# Patient Record
Sex: MALE | Race: WHITE | NOT HISPANIC OR LATINO | Employment: OTHER | URBAN - METROPOLITAN AREA
[De-identification: names, ages, dates, MRNs, and addresses within clinical notes are randomized per-mention and may not be internally consistent; named-entity substitution may affect disease eponyms.]

---

## 2017-03-20 ENCOUNTER — ALLSCRIPTS OFFICE VISIT (OUTPATIENT)
Dept: OTHER | Facility: OTHER | Age: 37
End: 2017-03-20

## 2017-05-11 ENCOUNTER — ALLSCRIPTS OFFICE VISIT (OUTPATIENT)
Dept: OTHER | Facility: OTHER | Age: 37
End: 2017-05-11

## 2017-05-11 DIAGNOSIS — R94.6 ABNORMAL RESULTS OF THYROID FUNCTION STUDIES: ICD-10-CM

## 2017-09-05 ENCOUNTER — APPOINTMENT (OUTPATIENT)
Dept: LAB | Facility: HOSPITAL | Age: 37
End: 2017-09-05
Payer: MEDICARE

## 2017-09-05 DIAGNOSIS — R94.6 ABNORMAL RESULTS OF THYROID FUNCTION STUDIES: ICD-10-CM

## 2017-09-05 LAB
T3 SERPL-MCNC: 0.5 NG/ML (ref 0.6–1.8)
T4 FREE SERPL-MCNC: 0.92 NG/DL (ref 0.76–1.46)
TSH SERPL DL<=0.05 MIU/L-ACNC: 2.7 UIU/ML (ref 0.36–3.74)

## 2017-09-05 PROCEDURE — 84439 ASSAY OF FREE THYROXINE: CPT

## 2017-09-05 PROCEDURE — 84443 ASSAY THYROID STIM HORMONE: CPT

## 2017-09-05 PROCEDURE — 36415 COLL VENOUS BLD VENIPUNCTURE: CPT

## 2017-09-05 PROCEDURE — 84480 ASSAY TRIIODOTHYRONINE (T3): CPT

## 2017-09-14 ENCOUNTER — GENERIC CONVERSION - ENCOUNTER (OUTPATIENT)
Dept: OTHER | Facility: OTHER | Age: 37
End: 2017-09-14

## 2017-09-14 DIAGNOSIS — R94.6 ABNORMAL RESULTS OF THYROID FUNCTION STUDIES: ICD-10-CM

## 2017-10-31 ENCOUNTER — APPOINTMENT (OUTPATIENT)
Dept: LAB | Facility: HOSPITAL | Age: 37
End: 2017-10-31
Payer: MEDICARE

## 2017-10-31 DIAGNOSIS — R94.6 ABNORMAL RESULTS OF THYROID FUNCTION STUDIES: ICD-10-CM

## 2017-10-31 LAB
T3FREE SERPL-MCNC: 3.33 PG/ML (ref 2.3–4.2)
TSH SERPL DL<=0.05 MIU/L-ACNC: 3.18 UIU/ML (ref 0.36–3.74)

## 2017-10-31 PROCEDURE — 84481 FREE ASSAY (FT-3): CPT

## 2017-10-31 PROCEDURE — 84443 ASSAY THYROID STIM HORMONE: CPT

## 2017-10-31 PROCEDURE — 36415 COLL VENOUS BLD VENIPUNCTURE: CPT

## 2017-11-03 ENCOUNTER — ALLSCRIPTS OFFICE VISIT (OUTPATIENT)
Dept: OTHER | Facility: OTHER | Age: 37
End: 2017-11-03

## 2017-11-03 DIAGNOSIS — J30.2 OTHER SEASONAL ALLERGIC RHINITIS: ICD-10-CM

## 2017-11-04 NOTE — PROGRESS NOTES
Assessment  1  Anxiety (300 00) (F41 9)   2  Depression (311) (F32 9)   3  Thyroid function test abnormal (794 5) (R94 6)   4  Chronic sinusitis (473 9) (J32 9)   5  Seasonal allergies (477 9) (J30 2)    Plan  Chronic sinusitis    · 1 - Hao Taveras MD, Kimi Cardoza  (Otolaryngology) Co-Management  *  Status: Hold For - Scheduling  Requested  for: 23BHI0856  Care Summary provided  : Yes  Cigarette nicotine dependence in remission    · BuPROPion HCl ER (SR) 150 MG Oral Tablet Extended Release 12 Hour (Wellbutrin SR); Take  1 tablet twice daily  Screening for depression    · *VB-Depression Screening; Status:Complete;   Done: 08ICN2000 12:48PM   · *VB-Depression Screening ; every 1 year; Last 17IQD0794; Next 85XXF0981; Status:Active  Seasonal allergies    · (1) ALLERGY, [facility] PANEL ADULT; Status:Active; Requested W:83VQN9766;     Discussion/Summary  Discussion Summary:   Allergies - Will check [place] allergy panel  sinusitis x 1 year - D/w pt ENT vs sinus CT  He would like to start with ENT and have a second opinion on if the CT of sinuses is necessary  Referral given to Dr Hao Taveras    - Stable on medications and doing well  Will see psychiatrist 12/29, aware he can make appointment here if any symptoms worsen sooner  loss - DIscussed increasing protein and condensing calories by eating higher calorie good foods if he does not like large portions  to follow up in 4 months  Counseling Documentation With Imm: The patient was counseled regarding diagnostic results,-- instructions for management,-- risk factor reductions,-- prognosis,-- patient and family education,-- impressions,-- risks and benefits of treatment options,-- importance of compliance with treatment  Medication SE Review and Pt Understands Tx: Possible side effects of new medications were reviewed with the patient/guardian today  The treatment plan was reviewed with the patient/guardian   The patient/guardian understands and agrees with the treatment plan      Chief Complaint  Chief Complaint Free Text Note Form: Pt is here for a f/u appt  BW       History of Present Illness  HPI: Patient is here for follow up of sinusitis and lab work  Patient has history of abnormal Free T3, new labs from 10/31 reviewed, normal T3 and TSH    - Patient has been having ongoing issue with his sinuses  He was seen 1 year ago and reports that he was given an antibiotic which he states did not work and was later given a steroid  He then saw the [facility] for a sinus infection in May and was told it was allergies  He was given flonase and zyrtec  He has continued to take these medications daily  He was then seen in September for sinusitis and started on Augmentin  He felt better while taking the antibiotic, after stopping his symptoms returned almost immediately  He complains today of irritation and feeling like something is stuck in the left nostril  He reports frequent sinus headaches on the left side  No nasal discharge on the left side  He denies fever, chills, runny nose, sore throat or ear pain or pressure  note he was using an air condition for 4 months that he later came to find had a lot of mold in it  - Patient has a new psychiatrist in [facility]  He reports that in [place] the turnover of psychiatrists was very quick and he had a hard time getting appointments and building a relationship  He has an appointment on 12/29 to follow up in [place]  He feels that he is doing well  He has been on disability but 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 and with his [employer]  He reports that 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  He feels being connected with the community has helped him a lot  He jokes, I donât even have time to be anxious anymore He has been taking his medications as prescribed and has no concerns or issues with them   In response to his 10lb weight loss over the summer, he reports this is normal for him because he does not have a car and walks everywhere and is very active  He states I do not eat for pleasure, I eat for survival, the [employer] did that to me      Review of Systems  PHQ-9 Depression Scale: Over the past 2 weeks, how often have you been bothered by the following problems? 1 ) Little interest or pleasure in doing things? Not at all    2 ) Feeling down, depressed or hopeless? Not at all    3 ) Trouble falling asleep or sleeping too much? Several days  4 ) Feeling tired or having little energy? Not at all    5 ) Poor appetite or overeating? Nearly every day  6 ) Feeling bad about yourself, or that you are a failure, or have let yourself or your family down? Not at all    7 ) Trouble concentrating on things, such as reading a newspaper or watching television? Not at all    8 ) Moving or speaking so slowly that other people could have noticed, or the opposite, moving or speaking faster than usual? Not at all  How difficult have these problems made it for you to do your work, take care of things at home, or get along with people? Not at all  Score 4      Active Problems  1  Anxiety (300 00) (F41 9)   2  Arthralgia of left knee (719 46) (M25 562)   3  Cigarette nicotine dependence in remission (V15 82) (F17 211)   4  Depression (311) (F32 9)   5  Fatigue (780 79) (R53 83)   6  Lung nodule (793 11) (R91 1)   7  Sinusitis (473 9) (J32 9)   8  Testicular discomfort (608 9) (N50 819)   9  Thyroid function test abnormal (794 5) (R94 6)    Past Medical History  1  History of Abdominal discomfort (789 00) (R10 9)   2  History of Acute maxillary sinusitis (461 0) (J01 00)   3  History of Cellulitis of back (682 2) (L03 312)   4  History of Compound nevus of back (216 5) (D23 5)   5  History of Corneal irritation of left eye (371 89) (Y98 301)   6  History of Dacrocystitis, bilateral (375 30) (H04 303)   7  History of Dry eye (375 15) (H04 129)   8   History of Foreign body in eye, left, initial encounter (74 310 744) (T15 92XA)   9  History of Foreign body in eye, right, initial encounter (10-32549174) (T15 91XA)   10  History of acute sinusitis (V12 69) (Z87 09)   11  History of common cold (V12 09) (Z86 19)   12  History of constipation (V12 79) (Z87 19)   13  History of epididymitis (V13 89) (Z87 438)   14  History of orchitis (V13 89) (Z87 438)   15  History of Hydrocele, acquired (603 9) (N43 3)   16  History of Infected cyst of skin (706 2) (L72 9,L08 9)   17  History of Infected sebaceous cyst of skin (706 2) (L72 3,L08 9)   18  History of Joint pain in fingers of right hand (719 44) (M25 541)   19  History of Lipid screening (V77 91) (Z13 220)   20  History of Mucositis oral (528 00) (K12 30)   21  History of Need for Tdap vaccination (V06 1) (Z23)   22  History of Needs flu shot (V04 81) (Z23)   23  History of Orchalgia (608 9) (N50 819)   24  History of TSH elevation (794 5) (R94 6)   25  History of Weight loss (783 21) (R63 4)  Active Problems And Past Medical History Reviewed: The active problems and past medical history were reviewed and updated today  Surgical History  1  Denied: History Of Prior Surgery  Surgical History Reviewed: The surgical history was reviewed and updated today  Family History  Mother    1  Family history of Cancer   2  Family history of glioblastoma (V16 8) (Z80 8)  Sister    3  Family history of Type 1 diabetes mellitus with complications  Family History Reviewed: The family history was reviewed and updated today  Social History   · Former smoker (I14 72) (N82 080)   · Denied: History of IV drugs   · No alcohol use   · No drug use   · Recently stopped smoking   · Denied: History of Uses marijuana  Social History Reviewed: The social history was reviewed and updated today  Current Meds   1  BuPROPion HCl ER (SR) 150 MG Oral Tablet Extended Release 12 Hour; Take 1 tablet twice daily;    Therapy: 42WKU9765 to (Eladia Hernandez)  Requested for: 45UAI7678; Last Rx:11May2017   Ordered   2  Divalproex Sodium 250 MG Oral Tablet Delayed Release; TAKE 3 TABLET Bedtime; Therapy: 59DBM7272 to Recorded   3  Fluticasone Propionate 50 MCG/ACT Nasal Suspension; USE 1 SPRAY IN EACH NOSTRIL TWICE   DAILY; Therapy: 14Sep2017 to Recorded   4  Restasis 0 05 % Ophthalmic Emulsion; INSTILL 1 DROP IN [facility] EYE TWICE DAILY; Therapy: (Recorded:11May2017) to Recorded   5  RisperiDONE 4 MG Oral Tablet; TAKE 1 TABLET DAILY; Therapy: (Recorded:17Cid9094) to Recorded   6  ZyrTEC Allergy 10 MG Oral Tablet; TAKE 1 TABLET DAILY AS DIRECTED; Therapy: 14Sep2017 to Recorded  Medication List Reviewed: The medication list was reviewed and updated today  Allergies  1  Lithium Carbonate TABS  2  No Known Environmental Allergies   3  No Known Food Allergies    Vitals  Vital Signs    Recorded: 56RMK0015 12:38PM   Temperature 97 9 F, Oral   Heart Rate 84   Systolic 027, LUE, Sitting   Diastolic 78, LUE, Sitting   Height 6 ft 0 75 in   Weight 149 lb 4 oz   BMI Calculated 19 83   BSA Calculated 1 9   O2 Saturation 98, RA     Physical Exam    Constitutional   General appearance: No acute distress, well appearing and well nourished  underweight  Eyes   Conjunctiva and lids: No swelling, erythema, or discharge  Pupils and irises: Equal, round and reactive to light  Ears, Nose, Mouth, and Throat   External inspection of ears and nose: Normal     Otoscopic examination: Tympanic membrance translucent with normal light reflex  Canals patent without erythema  Nasal mucosa, septum, and turbinates: Normal without edema or erythema  Oropharynx: Normal with no erythema, edema, exudate or lesions  Pulmonary   Respiratory effort: No increased work of breathing or signs of respiratory distress  Auscultation of lungs: Clear to auscultation, equal breath sounds bilaterally, no wheezes, no rales, no rhonci      Cardiovascular   Auscultation of heart: Normal rate and rhythm, normal S1 and S2, without murmurs  Examination of extremities for edema and/or varicosities: Normal     Lymphatic   Palpation of lymph nodes in neck: No lymphadenopathy  Musculoskeletal   Gait and station: Normal     Skin   Skin and subcutaneous tissue: Normal without rashes or lesions  Psychiatric   Orientation to person, place and time: Normal     Mood and affect: Normal  -- Very pleasant and cooperative  Head and Face: Maxillary sinuses: non-tender on the right-- and-- non-tender on the left  Frontal sinuses: non-tender on the right-- and-- non-tender on the left  Results/Data  *VB-Depression Screening 63TUR0301 12:48PM Yi Jaquez     Test Name Result Flag Reference   Depression Scale Result      Depression Screen - Negative For Symptoms     (1) FREE T3 78JVJ7760 12:58PM Hansjunie Mackenzie Order Number: [id number]     Test Name Result Flag Reference   FREE T3 3 33 pg/mL  2 30-4 20     (1) TSH 49Czc9267 12:58PM Soledad Ramirez    Order Number: [id number]     Test Name Result Flag Reference   TSH 3 180 uIU/mL  0 358-3 740   Patients undergoing fluorescein dye angiography may retain small amounts of fluorescein in the body for 48-72 hours post procedure  Samples containing fluorescein can produce falsely depressed TSH values  If the patient had this procedure,a specimen should be resubmitted post fluorescein clearance  Future Appointments    Date/Time Provider Specialty Site   11/10/2017 11:15 AM Katie Yuen DO Internal Medicine [facility]     Signatures   Electronically signed by : Parul Hernandez; Nov  3 2017  1:29PM EST                       (Author)    Electronically signed by :  Tez Cho MD; Nov  3 2017  4:18PM EST                       (Author)

## 2018-01-12 VITALS
HEIGHT: 74 IN | DIASTOLIC BLOOD PRESSURE: 70 MMHG | WEIGHT: 159 LBS | SYSTOLIC BLOOD PRESSURE: 110 MMHG | BODY MASS INDEX: 20.41 KG/M2 | HEART RATE: 80 BPM

## 2018-01-12 NOTE — RESULT NOTES
Verified Results  (1) TSH 31Oct2017 12:58PM Ashli Raheel AMADO Order Number: [id number]     Test Name Result Flag Reference   TSH 3 180 uIU/mL  0 358-3 740   Patients undergoing fluorescein dye angiography may retain small amounts of fluorescein in the body for 48-72 hours post procedure  Samples containing fluorescein can produce falsely depressed TSH values  If the patient had this procedure,a specimen should be resubmitted post fluorescein clearance       (1) FREE T3 31Oct2017 12:58PM Dannie Darling Order Number: [id number]     Test Name Result Flag Reference   FREE T3 3 33 pg/mL  2 30-4 20

## 2018-01-12 NOTE — PROGRESS NOTES
Assessment    1  Foreign body in eye, left, initial encounter (76 310 744) (T15 92XA)   2  Corneal irritation of left eye (371 89) (I00 046)    Discussion/Summary    Patient was instructed to continue with Systane eyedrops alone or together with use of normal saline eyedrops  He is instructed to call if he should experience any additional difficulty  Chief Complaint    1  Eye Pain  Pt feels like he has something in his eye since Friday  He started using Refresh eyedrops that caused him pain, and switched to Systane Ultra and it doesn't cause him pain  History of Present Illness  HPI: Patient seen with complaints of pain from his left eye  He had some irritation from a foreign and had gone to an eye doctor at the [place] where he was found to have an eyelash in his eye which was removed with a Q-tip and irrigation  Subsequently he had a similar sensation in his eyes so he attempted to remove it himself with use of a Q-tip  This that time he has had some irritation in his left eye but unable to notice any foreign body  He was told to get some Refresh Tears but this resulted in some increased discomfort and he didn't like the greasy feeling, so he switched to Systane eyedrops which did not cause any pain or discomfort  He presented here to evaluate his if for any additional problem      Active Problems    1  Anxiety (300 00) (F41 9)   2  Arthralgia of left knee (719 46) (M25 562)   3  Depression (311) (F32 9)   4  Epididymitis (604 90) (N45 1)   5  Fatigue (780 79) (R53 83)   6  Lung nodule (793 11) (R91 1)   7  Orchalgia (608 9) (N50 9)   8  Tobacco abuse (305 1) (Z72 0)   9  Weight loss (783 21) (R63 4)    Social History    · Former smoker (V15 82) (L98 424)   · started patch on 8/3/15   · Denied: History of IV drugs   · No alcohol use   · No drug use   · Denied: History of Uses marijuana    Current Meds   1  Daily Multivitamin TABS; TAKE 1 TABLET DAILY; Therapy: (Recorded:61Yac1050) to Recorded   2  Divalproex Sodium 250 MG Oral Tablet Delayed Release; TAKE 1 TABLET 3 TIMES   DAILY; Therapy: 35LEA0005 to Recorded   3  RisperiDONE 4 MG Oral Tablet; TAKE 1 TABLET DAILY; Therapy: (Recorded:74Hny0682) to Recorded   4  Systane Balance 0 6 % Ophthalmic Solution; INSTILL 2 DROP 3 times daily; Therapy: 79BKM7724 to Recorded    Allergies    1  Lithium Carbonate TABS    2  No Known Environmental Allergies   3  No Known Food Allergies    Vitals   Recorded: 30SBY3284 10:35AM   Temperature 97 9 F, Oral   Heart Rate 093   Systolic 519, LUE, Sitting   Diastolic 70, LUE, Sitting   Height 6 ft 1 in   Weight 155 lb 8 oz   BMI Calculated 20 52   BSA Calculated 1 93   O2 Saturation 99, RA     Physical Exam    Eyes   Conjunctiva and lids: No swelling, erythema, or discharge  With use of flourescein strip the left eye was examined and there was no evidence of corneal ulcer  Pupils and irises: Equal, round and reactive to light           Future Appointments    Date/Time Provider Specialty Site   05/04/2016 01:30 PM Harish Garcia, [address] [facility] Internal Medicine [facility] PRIMARY CARE     Signatures   Electronically signed by : Carlos Lundberg MD; Jan 18 2016 11:19AM EST                       (Author)

## 2018-01-13 VITALS
DIASTOLIC BLOOD PRESSURE: 70 MMHG | BODY MASS INDEX: 20.47 KG/M2 | SYSTOLIC BLOOD PRESSURE: 110 MMHG | OXYGEN SATURATION: 98 % | HEIGHT: 74 IN | HEART RATE: 90 BPM | WEIGHT: 159.5 LBS | TEMPERATURE: 98.5 F

## 2018-01-14 VITALS
DIASTOLIC BLOOD PRESSURE: 78 MMHG | TEMPERATURE: 97.9 F | OXYGEN SATURATION: 98 % | SYSTOLIC BLOOD PRESSURE: 114 MMHG | BODY MASS INDEX: 19.78 KG/M2 | WEIGHT: 149.25 LBS | HEART RATE: 84 BPM | HEIGHT: 73 IN

## 2018-01-17 ENCOUNTER — TRANSCRIBE ORDERS (OUTPATIENT)
Dept: ADMINISTRATIVE | Facility: HOSPITAL | Age: 38
End: 2018-01-17

## 2018-01-17 DIAGNOSIS — J32.9 CHRONIC SINUSITIS, UNSPECIFIED LOCATION: Primary | ICD-10-CM

## 2018-01-22 VITALS
DIASTOLIC BLOOD PRESSURE: 60 MMHG | HEART RATE: 106 BPM | TEMPERATURE: 97.5 F | HEIGHT: 73 IN | SYSTOLIC BLOOD PRESSURE: 118 MMHG | WEIGHT: 151.38 LBS | BODY MASS INDEX: 20.06 KG/M2 | OXYGEN SATURATION: 99 %

## 2018-01-26 ENCOUNTER — HOSPITAL ENCOUNTER (OUTPATIENT)
Dept: CT IMAGING | Facility: HOSPITAL | Age: 38
Discharge: HOME | End: 2018-01-26
Attending: OTOLARYNGOLOGY
Payer: MEDICARE

## 2018-01-26 DIAGNOSIS — J32.9 CHRONIC SINUSITIS, UNSPECIFIED LOCATION: ICD-10-CM

## 2018-01-26 PROCEDURE — 70486 CT MAXILLOFACIAL W/O DYE: CPT

## 2018-04-27 ENCOUNTER — OFFICE VISIT (OUTPATIENT)
Dept: INTERNAL MEDICINE CLINIC | Facility: CLINIC | Age: 38
End: 2018-04-27
Payer: MEDICARE

## 2018-04-27 VITALS
HEIGHT: 75 IN | SYSTOLIC BLOOD PRESSURE: 110 MMHG | WEIGHT: 147.4 LBS | HEART RATE: 109 BPM | OXYGEN SATURATION: 98 % | BODY MASS INDEX: 18.33 KG/M2 | DIASTOLIC BLOOD PRESSURE: 64 MMHG | TEMPERATURE: 98.7 F

## 2018-04-27 DIAGNOSIS — J06.9 UPPER RESPIRATORY TRACT INFECTION, UNSPECIFIED TYPE: Primary | ICD-10-CM

## 2018-04-27 PROBLEM — J30.2 SEASONAL ALLERGIES: Status: ACTIVE | Noted: 2017-11-03

## 2018-04-27 PROBLEM — J32.9 CHRONIC SINUSITIS: Status: ACTIVE | Noted: 2017-11-03

## 2018-04-27 PROBLEM — R94.6 THYROID FUNCTION TEST ABNORMAL: Status: ACTIVE | Noted: 2017-09-14

## 2018-04-27 PROCEDURE — 99213 OFFICE O/P EST LOW 20 MIN: CPT | Performed by: NURSE PRACTITIONER

## 2018-04-27 RX ORDER — CYCLOSPORINE 0.5 MG/ML
1 EMULSION OPHTHALMIC 2 TIMES DAILY
COMMUNITY

## 2018-04-27 RX ORDER — BUPROPION HYDROCHLORIDE 150 MG/1
1 TABLET, EXTENDED RELEASE ORAL 2 TIMES DAILY
COMMUNITY
Start: 2016-05-16 | End: 2018-05-21 | Stop reason: SDUPTHER

## 2018-04-27 RX ORDER — AMOXICILLIN AND CLAVULANATE POTASSIUM 875; 125 MG/1; MG/1
1 TABLET, FILM COATED ORAL EVERY 12 HOURS SCHEDULED
Qty: 14 TABLET | Refills: 0 | Status: SHIPPED | OUTPATIENT
Start: 2018-04-27 | End: 2018-05-04

## 2018-04-27 NOTE — PATIENT INSTRUCTIONS
I do not feel your upper respiratory symptoms are due to bacterial infection - most likely viral infection  Continue allergy medication on a daily basis  Continue using nasal sprays - Ayr and ocean  Increase fluid intake  May use OTC Mucinex (or generic)- plain Mucinex - not Mucinex DM  A prescription for Augmentin was sent to your pharmacy should your symptoms worsen over the weekend or you develop fever, increased chills, productive cough (yellow/greenish sputum)  Call office if your symptoms worsen or do not improve  Upper Respiratory Infection   WHAT YOU NEED TO KNOW:   An upper respiratory infection is also called the common cold  It is an infection that can affect your nose, throat, ears, and sinuses  For healthy people, the common cold is usually not serious and does not need special treatment  Cold symptoms are usually worst for the first 3 to 5 days  Most people get better in 7 to 14 days  You may continue to cough for 2 to 3 weeks  Colds are caused by viruses and do not get better with antibiotics  DISCHARGE INSTRUCTIONS:   Return to the emergency department if:   · You have chest pain or trouble breathing  Contact your healthcare provider if:   · You have a fever over 102ºF (39°C)  · Your sore throat gets worse or you see white or yellow spots in your throat  · Your symptoms get worse after 3 to 5 days or your cold is not better in 14 days  · You have a rash anywhere on your skin  · You have large, tender lumps in your neck  · You have thick, green or yellow drainage from your nose  · You cough up thick yellow, green, or bloody mucus  · You have vomiting for more than 24 hours and cannot keep fluids down  · You have a bad earache  · You have questions or concerns about your condition or care  Medicines: You may need any of the following:  · Decongestants  help reduce nasal congestion and help you breathe more easily   If you take decongestant pills, they may make you feel restless or cause problems with your sleep  Do not use decongestant sprays for more than a few days  · Cough suppressants  help reduce coughing  Ask your healthcare provider which type of cough medicine is best for you  · NSAIDs , such as ibuprofen, help decrease swelling, pain, and fever  NSAIDs can cause stomach bleeding or kidney problems in certain people  If you take blood thinner medicine, always ask your healthcare provider if NSAIDs are safe for you  Always read the medicine label and follow directions  · Acetaminophen  decreases pain and fever  It is available without a doctor's order  Ask how much to take and how often to take it  Follow directions  Read the labels of all other medicines you are using to see if they also contain acetaminophen, or ask your doctor or pharmacist  Acetaminophen can cause liver damage if not taken correctly  Do not use more than 4 grams (4,000 milligrams) total of acetaminophen in one day  · Take your medicine as directed  Contact your healthcare provider if you think your medicine is not helping or if you have side effects  Tell him or her if you are allergic to any medicine  Keep a list of the medicines, vitamins, and herbs you take  Include the amounts, and when and why you take them  Bring the list or the pill bottles to follow-up visits  Carry your medicine list with you in case of an emergency  Follow up with your healthcare provider as directed:  Write down your questions so you remember to ask them during your visits  Self-care:   · Rest as much as possible  Slowly start to do more each day  · Drink more liquids as directed  Liquids will help thin and loosen mucus so you can cough it up  Liquids will also help prevent dehydration  Liquids that help prevent dehydration include water, fruit juice, and broth  Do not drink liquids that contain caffeine  Caffeine can increase your risk for dehydration   Ask your healthcare provider how much liquid to drink each day  · Soothe a sore throat  Gargle with warm salt water  This helps your sore throat feel better  Make salt water by dissolving ¼ teaspoon salt in 1 cup warm water  You may also suck on hard candy or throat lozenges  You may use a sore throat spray  · Use a humidifier or vaporizer  Use a cool mist humidifier or a vaporizer to increase air moisture in your home  This may make it easier for you to breathe and help decrease your cough  · Use saline nasal drops as directed  These help relieve congestion  · Apply petroleum-based jelly around the outside of your nostrils  This can decrease irritation from blowing your nose  · Do not smoke  Nicotine and other chemicals in cigarettes and cigars can make your symptoms worse  They can also cause infections such as bronchitis or pneumonia  Ask your healthcare provider for information if you currently smoke and need help to quit  E-cigarettes or smokeless tobacco still contain nicotine  Talk to your healthcare provider before you use these products  Prevent spreading your cold to others:   · Try to stay away from other people during the first 2 to 3 days of your cold when it is more easily spread  · Do not share food or drinks  · Do not share hand towels with household members  · Wash your hands often, especially after you blow your nose  Turn away from other people and cover your mouth and nose with a tissue when you sneeze or cough  © 2017 [employer] Information is for End User's use only and may not be sold, redistributed or otherwise used for commercial purposes  All illustrations and images included in CareNotes® are the copyrighted property of A D A M , Inc  or Kaiden Padilla  The above information is an  only  It is not intended as medical advice for individual conditions or treatments   Talk to your doctor, nurse or pharmacist before following any medical regimen to see if it is safe and effective for you

## 2018-04-27 NOTE — PROGRESS NOTES
Assessment/Plan:     Diagnoses and all orders for this visit:    Upper respiratory tract infection, unspecified type  No overt signs or symptoms of bacterial infection  Most likely viral in origin  I did sent Rx for Augmentin and instructed patient to  antibiotics if his symptoms worsen over the weekend - develops fever, increased chills, increased sore throat or productive cough (yellow/green sputum)  Instructed patient to take allergy medication on a daily basis  Continue nasal sprays  May take OTC Mucinex (told patient not to get DM due to elevated heart rate)  Instructed to increase fluids  Call office should symptoms worsen or not improve  Written information regarding URI given to patient for his perusal     -     amoxicillin-clavulanate (AUGMENTIN) 875-125 mg per tablet; Take 1 tablet by mouth every 12 (twelve) hours for 7 days    Other orders  -     buPROPion (WELLBUTRIN SR) 150 mg 12 hr tablet; Take 1 tablet by mouth 2 (two) times a day  -     Cetirizine HCl (ZYRTEC ALLERGY) 10 MG CAPS; Take 1 tablet by mouth daily  -     cycloSPORINE (RESTASIS) 0 05 % ophthalmic emulsion; Apply 1 drop to eye 2 (two) times a day      Subjective:      Patient ID: Vinayak Rogers II is a 41 y o  male  Patient is sen for a same day visit due to c/o sore throat since Wednesday  He states that on Wednesday morning he woke up with sore throat  He continues to have a sore throat but now has  Developed dry cough, "achy", PND and runny nose  Denies headache, ear aches, fever, abdominal pain, nausea, diarreha  He does have chills  Denies sick contacts  He has tried allergy medication (claritin yesterday)  Patient does have a history of "chronic sinusitis" and has seen [facility] for evaluation and is due to see them in follow up next Friday  He states his Flonase was d/c due to nose bleeds and he was instructed to use [place] and [place] nasal sprays    His CT scan of the sinuses showed "Well pneumatized paranasal sinuses "  Patient also follows regularly with the [facility] and see psychiatrist in [place] for his schizo-effective disorder  Sore Throat  Patient complains of sore throat  Associated symptoms include chills, nasal blockage, post nasal drip and sore throat  Onset of symptoms was 2 days ago, and have been unchanged since that time  He is drinking moderate amounts of fluids  He has not had recent close exposure to someone with proven streptococcal pharyngitis  The following portions of the patient's history were reviewed and updated as appropriate: allergies, current medications, past family history, past medical history, past social history, past surgical history and problem list     Review of Systems   Constitutional: Positive for chills  Negative for fatigue and fever  HENT: Positive for postnasal drip, sneezing and sore throat  Negative for ear discharge, ear pain, sinus pain and sinus pressure  Eyes: Negative  Respiratory: Positive for cough  Negative for chest tightness and shortness of breath  Cardiovascular: Negative for chest pain, palpitations and leg swelling  Gastrointestinal: Negative  Genitourinary: Negative  Skin: Negative  Neurological: Negative for dizziness, light-headedness and headaches  Hematological: Negative for adenopathy  Does not bruise/bleed easily  Objective:    /64 (BP Location: Left arm, Patient Position: Sitting, Cuff Size: Adult)   Pulse (!) 109   Temp 98 7 °F (37 1 °C) (Oral)   Ht 6' 2 5" (1 892 m)   Wt 66 9 kg (147 lb 6 4 oz)   SpO2 98%   BMI 18 67 kg/m²      Physical Exam   Constitutional: He is oriented to person, place, and time  He appears well-developed and well-nourished  HENT:   Head: Normocephalic and atraumatic  Right Ear: External ear normal    Left Ear: External ear normal    Nose: Nose normal    Mouth/Throat: No oropharyngeal exudate     Bilateral ears unremarkable with good cone of light seen     Throat is slightly erythematous but no exudate  Eyes: Conjunctivae and EOM are normal  Pupils are equal, round, and reactive to light  Neck: Normal range of motion  Neck supple  No thyromegaly present  Cardiovascular: Normal rate, regular rhythm and normal heart sounds  Pulmonary/Chest: Effort normal and breath sounds normal  He has no wheezes  Abdominal: Soft  Bowel sounds are normal    Musculoskeletal: Normal range of motion  He exhibits no edema or tenderness  Lymphadenopathy:     He has no cervical adenopathy  Neurological: He is alert and oriented to person, place, and time  Skin: Skin is warm and dry  No rash noted  No erythema  No pallor  Psychiatric: He has a normal mood and affect   His behavior is normal  Judgment and thought content normal

## 2018-05-08 RX ORDER — BUPROPION HYDROCHLORIDE 150 MG/1
TABLET, EXTENDED RELEASE ORAL
Qty: 180 TABLET | Refills: 1 | OUTPATIENT
Start: 2018-05-08

## 2018-05-21 ENCOUNTER — OFFICE VISIT (OUTPATIENT)
Dept: INTERNAL MEDICINE CLINIC | Facility: CLINIC | Age: 38
End: 2018-05-21
Payer: MEDICARE

## 2018-05-21 VITALS
HEIGHT: 74 IN | SYSTOLIC BLOOD PRESSURE: 138 MMHG | HEART RATE: 86 BPM | DIASTOLIC BLOOD PRESSURE: 82 MMHG | RESPIRATION RATE: 16 BRPM | BODY MASS INDEX: 19.07 KG/M2 | OXYGEN SATURATION: 99 % | TEMPERATURE: 99.1 F | WEIGHT: 148.6 LBS

## 2018-05-21 DIAGNOSIS — F41.9 ANXIETY: ICD-10-CM

## 2018-05-21 DIAGNOSIS — R94.6 THYROID FUNCTION TEST ABNORMAL: ICD-10-CM

## 2018-05-21 DIAGNOSIS — K52.9 GASTROENTERITIS: ICD-10-CM

## 2018-05-21 DIAGNOSIS — Z13.220 SCREENING, LIPID: ICD-10-CM

## 2018-05-21 DIAGNOSIS — J30.2 SEASONAL ALLERGIC RHINITIS, UNSPECIFIED TRIGGER: ICD-10-CM

## 2018-05-21 DIAGNOSIS — L72.9 SKIN CYST: ICD-10-CM

## 2018-05-21 DIAGNOSIS — Z87.891 DISCONTINUED SMOKING: Primary | ICD-10-CM

## 2018-05-21 DIAGNOSIS — Z13.1 ENCOUNTER FOR SCREENING EXAMINATION FOR IMPAIRED GLUCOSE REGULATION AND DIABETES MELLITUS: ICD-10-CM

## 2018-05-21 PROCEDURE — 99214 OFFICE O/P EST MOD 30 MIN: CPT | Performed by: PHYSICIAN ASSISTANT

## 2018-05-21 RX ORDER — BUPROPION HYDROCHLORIDE 150 MG/1
150 TABLET, EXTENDED RELEASE ORAL 2 TIMES DAILY
Qty: 180 TABLET | Refills: 3 | Status: SHIPPED | OUTPATIENT
Start: 2018-05-21 | End: 2018-08-24 | Stop reason: SDUPTHER

## 2018-05-21 RX ORDER — LORATADINE 10 MG/1
10 TABLET ORAL DAILY
COMMUNITY
End: 2018-05-21 | Stop reason: ALTCHOICE

## 2018-05-21 NOTE — ASSESSMENT & PLAN NOTE
Symptoms are likely viral in origin and self limiting  Do not use antidiarrheals  Maintain adequate rest   Reviewed importance of adequate fluid intake (small frequent sips) to prevent dehydration  Encouraged use of electrolyte containing sports drinks (Gatorade or Powerade) mixed with water and take small frequent sips throughout the day  Early refeeding can reduce illness duration and improve outcomes  Encourage small bites, bland diet  Avoid fatty foods, greasy foods, and dairy which all may worsen symptoms  Discussed hand hygiene to prevent spread  Minimize close contact with other individuals while symptomatic  Gradual advancement of diet from bland foods to regular diet  Call the office if symptoms worsen or do not improve  Discussed red flag and ER precautions which need immediate eval and treat  May add OTC fiber supplement for regularity going forward  discussed patient's weight history as well as oral intake  Patient currently notes he eats 1-2 meals a day and is very active walking throughout the day  Due to his height and weight discussed his caloric intake need  Patient notes he has sufficient amounts of food and has in the past utilize soup kitchen and food pantry is  Discussed community  Resources including local [employer] which patient notes he has contact information for and will get in contact with  Discussed importance of oral intake    Patient verbalized understanding and will report back to our office is having any difficulty

## 2018-05-21 NOTE — PROGRESS NOTES
[facility]  Standard Office Visit  Patient ID: Julio Khan III    : 1980  Age/Gender: 41 y o  male     DATE: 2018      Assessment/Plan:    Gastroenteritis  Symptoms are likely viral in origin and self limiting  Do not use antidiarrheals  Maintain adequate rest   Reviewed importance of adequate fluid intake (small frequent sips) to prevent dehydration  Encouraged use of electrolyte containing sports drinks (Gatorade or Powerade) mixed with water and take small frequent sips throughout the day  Early refeeding can reduce illness duration and improve outcomes  Encourage small bites, bland diet  Avoid fatty foods, greasy foods, and dairy which all may worsen symptoms  Discussed hand hygiene to prevent spread  Minimize close contact with other individuals while symptomatic  Gradual advancement of diet from bland foods to regular diet  Call the office if symptoms worsen or do not improve  Discussed red flag and ER precautions which need immediate eval and treat  May add OTC fiber supplement for regularity going forward  discussed patient's weight history as well as oral intake  Patient currently notes he eats 1-2 meals a day and is very active walking throughout the day  Due to his height and weight discussed his caloric intake need  Patient notes he has sufficient amounts of food and has in the past utilize soup kitchen and food pantry is  Discussed community  Resources including local food bank which patient notes he has contact information for and will get in contact with  Discussed importance of oral intake  Patient verbalized understanding and will report back to our office is having any difficulty    Anxiety  Well controlled at this time on current medications  Follows with therapist and psychiatrist through [facility]   Currently taking Wellbutrin along with his other psychiatric medications    Symptoms are controlled will continue Wellbutrin  Discussed with time as he is no longer smoking may consider transitioning off of this medication however at this time is feeling well on his current medications  No further dose adjustments at this time  Continue same meds  Keep our office informed of any change in treatment plan per Psychiatry  Thyroid function test abnormal    History of abnormal TSH  Improved on repeat level  Will recheck TSH    Seasonal allergies   Patient recently seen by ENT and had a CT done of his sinuses  Adjustments were made to his antihistamine and nasal spray  He will call back to the office with the names of these medications  Skin cyst   Patient with skin cyst left of midline in area of T4 on his back  Notes he was to previously get this removed prior however symptoms improved and was not causing him pain so he elected to not proceed  Still has some induration and is bothersome at time  No redness or signs of infection  Will have him come to the office with Dr Wolm Riedel  For eval for removal       Diagnoses and all orders for this visit:    Discontinued smoking  -     buPROPion (WELLBUTRIN SR) 150 mg 12 hr tablet; Take 1 tablet (150 mg total) by mouth 2 (two) times a day    Gastroenteritis    Encounter for screening examination for impaired glucose regulation and diabetes mellitus  -     Comprehensive metabolic panel; Future    Anxiety  -     Comprehensive metabolic panel; Future  -     CBC and differential; Future  -     TSH, 3rd generation; Future    Screening, lipid  -     Comprehensive metabolic panel; Future  -     CBC and differential; Future  -     Lipid Panel with Direct LDL reflex; Future    Seasonal allergic rhinitis, unspecified trigger    Thyroid function test abnormal    Skin cyst    Other orders  -     Discontinue: loratadine (CLARITIN) 10 mg tablet;  Take 10 mg by mouth daily          Subjective:   Chief Complaint   Patient presents with    Follow-up     Pt is here for check up  Mayer Goldberg is a 41 y o  male who presents to the office on 5/21/2018 for     Follow up  Notes he wants to know how he can work to gain weight  Does eat 2 meals per day usually a breakfast of cereal or eggs  In afternoon eats peanut butter and jelly sandwiches  Notes that dinner does have chicken or dinner  Notes he had mole removed off his back about 1 year ago, wants to get it checked to see if it came back  Does have access to food  Notes he budgets a certain amount per month for food but at times  Notes he did participate with food bank when he was first in the area after leaving the homeless shelter  Notes things with therapist are going ok  He is scheduled in July  Notes his therapist rescheduled him  Notes he has had abdominal gas  Has not yet picked up OTC gas ex  Notes worse on an empty stomach  Does frequently get loose bowels  Fluctuates between loose and firm BM  Tobacco free for 2 years  No SI, no HI  Notes that he purchased weight, weight bench  Has been working out  Feels great  Anxiety   Presents for follow-up visit  Symptoms include excessive worry and nervous/anxious behavior  Patient reports no chest pain, confusion, depressed mood (Follows with therapist as well as psychiatrist through [facility]), nausea, palpitations or suicidal ideas  Primary symptoms comment: Feels that his anxiety and depressive symptoms have improved while on Wellbutrin  GI Problem   The primary symptoms include diarrhea  Primary symptoms do not include weight loss, fatigue, abdominal pain, nausea, vomiting, melena or hematochezia  Primary symptoms comment: Feels that his anxiety and depressive symptoms have improved while on Wellbutrin  The illness began today  The onset was sudden  The problem has been gradually improving  The illness does not include chills         The following portions of the patient's history were reviewed and updated as appropriate: allergies, current medications, past family history, past medical history, past social history, past surgical history and problem list     Review of Systems   Constitutional: Negative for chills, fatigue and weight loss  Respiratory: Negative for cough and wheezing  Cardiovascular: Negative for chest pain and palpitations  Gastrointestinal: Positive for diarrhea  Negative for abdominal pain, hematochezia, melena, nausea and vomiting  Psychiatric/Behavioral: Negative for confusion and suicidal ideas  The patient is nervous/anxious  Patient Active Problem List   Diagnosis    Anxiety    Depression    Arthralgia of left knee    Chronic sinusitis    Lung nodule    Seasonal allergies    Thyroid function test abnormal    Gastroenteritis    Skin cyst       Past Medical History:   Diagnosis Date    Dry eye     resolved 12/16/2016    Epididymitis, left     Hydrocele, acquired     resolved 11/19/2015 last assessed 03/09/2016    [facility]affective schizophrenia ([facility] [address] )     TSH elevation     resolved 09/14/2017       Past Surgical History:   Procedure Laterality Date    NO PAST SURGERIES      denied history of prior surgery         Current Outpatient Prescriptions:     buPROPion (WELLBUTRIN SR) 150 mg 12 hr tablet, Take 1 tablet (150 mg total) by mouth 2 (two) times a day, Disp: 180 tablet, Rfl: 3    cycloSPORINE (RESTASIS) 0 05 % ophthalmic emulsion, Apply 1 drop to eye 2 (two) times a day, Disp: , Rfl:     Multiple Vitamin (MULTIVITAMIN) tablet, Take 1 tablet by mouth daily  , Disp: , Rfl:     risperiDONE (RisperDAL) 4 mg tablet, Take 4 mg by mouth daily  , Disp: , Rfl:     valproic acid (DEPAKENE) 250 mg capsule, Take 250 mg by mouth 3 (three) times a day Indications: ER , Disp: , Rfl:     Cetirizine HCl (ZYRTEC ALLERGY) 10 MG CAPS, Take 1 tablet by mouth daily, Disp: , Rfl:     naproxen (NAPROSYN) 250 mg tablet, Take 250 mg by mouth 2 (two) times a day with meals  , Disp: , Rfl:     Allergies Allergen Reactions    Lithium Carbonate [Lithium] Other (See Comments)     acne       Social History     Social History    Marital status: Single     Spouse name: N/A    Number of children: N/A    Years of education: N/A     Social History Main Topics    Smoking status: Former Smoker     Packs/day: 1 00     Quit date: 2016    Smokeless tobacco: Never Used      Comment: quit 5/27/2016, recently stopped smoking last day of smoking 3/2/16  per [employer]ipts    Alcohol use No    Drug use: No      Comment: denied history of iv drugs, denied uses marijuana per allscripts    Sexual activity: Not on file     Other Topics Concern    Not on file     Social History Narrative    No narrative on file       Family History   Problem Relation Age of Onset    Brain cancer Mother     Cancer Mother      glioblastoma    Diabetes type I Father     COPD Father     Diabetes type I Sister      with complications       PHQ-9 Depression Screening    PHQ-9:    Frequency of the following problems over the past two weeks:       Little interest or pleasure in doing things:  0 - not at all  Feeling down, depressed, or hopeless:  0 - not at all  PHQ-2 Score:  0         Health Maintenance   Topic Date Due    HIV SCREENING  1980    DTaP,Tdap,and Td Vaccines (1 - Tdap) 04/25/2002    INFLUENZA VACCINE  09/01/2018    Depression Screening PHQ-9  05/21/2019       Immunization History   Administered Date(s) Administered    Influenza Quadrivalent Preservative Free 3 years and older IM 10/21/2015, 09/14/2017        Objective:  Vitals:    05/21/18 1510   BP: 138/82   BP Location: Left arm   Patient Position: Sitting   Cuff Size: Adult   Pulse: 86   Resp: 16   Temp: 99 1 °F (37 3 °C)   TempSrc: Oral   SpO2: 99%   Weight: 67 4 kg (148 lb 9 6 oz)   Height: 6' 1 75" (1 873 m)     Wt Readings from Last 3 Encounters:   05/21/18 67 4 kg (148 lb 9 6 oz)   04/27/18 66 9 kg (147 lb 6 4 oz)   11/03/17 67 7 kg (149 lb 4 oz)     Body mass index is 19 21 kg/m²  No exam data present       Physical Exam   Constitutional: He is oriented to person, place, and time  He appears well-developed and well-nourished  No distress  Alert pleasant cooperative  Seated in room in no acute distress   HENT:   Head: Normocephalic and atraumatic  Right Ear: External ear normal    Left Ear: External ear normal    Mouth/Throat: Oropharynx is clear and moist  No oropharyngeal exudate  MMM  Normal posterior pharynx   Eyes: Conjunctivae are normal  Pupils are equal, round, and reactive to light  Right eye exhibits no discharge  Left eye exhibits no discharge  No scleral icterus  Neck: Neck supple  Cardiovascular: Normal rate, regular rhythm and normal heart sounds  No murmur heard  Pulmonary/Chest: Effort normal and breath sounds normal  No respiratory distress  He has no wheezes  Clear to auscultation throughout  No crackles no rhonchi no wheeze  No respiratory distress  Abdominal: Soft  Bowel sounds are normal  There is no tenderness  Hyperactive bowel sounds  Abdomen soft nontender nondistended  No focal tenderness to palpation   Musculoskeletal: He exhibits no edema  No lower extremity edema   Neurological: He is alert and oriented to person, place, and time  No gross focal neurologic deficits   Skin: Skin is warm and dry  He is not diaphoretic  Palpable skin   Nodule left of midline in area of T4 on patient's back  Less than 0 5 cm  Well demarcated margins  Normal overlying skin  Firm  Nontender   Psychiatric: He has a normal mood and affect  His behavior is normal  Judgment and thought content normal    Nursing note and vitals reviewed            Future Appointments  Date Time Provider Department Center   6/26/2018 3:30 PM Katelyn Hoskins DO [address]   8/24/2018 3:30 PM Christiano Sandoval PA-C [address] [place] [address]    Patient Care Team:  Junior Lyons Ann-Marie Roberts MD as PCP - General  MD Travon Fernandez PA-C

## 2018-05-21 NOTE — ASSESSMENT & PLAN NOTE
Patient with skin cyst left of midline in area of T4 on his back  Notes he was to previously get this removed prior however symptoms improved and was not causing him pain so he elected to not proceed  Still has some induration and is bothersome at time  No redness or signs of infection    Will have him come to the office with Dr Richmond Dowd  For eval for removal

## 2018-05-21 NOTE — ASSESSMENT & PLAN NOTE
Patient recently seen by ENT and had a CT done of his sinuses  Adjustments were made to his antihistamine and nasal spray  He will call back to the office with the names of these medications

## 2018-05-21 NOTE — ASSESSMENT & PLAN NOTE
Well controlled at this time on current medications  Follows with therapist and psychiatrist through [facility]   Currently taking Wellbutrin along with his other psychiatric medications  Symptoms are controlled will continue Wellbutrin  Discussed with time as he is no longer smoking may consider transitioning off of this medication however at this time is feeling well on his current medications  No further dose adjustments at this time  Continue same meds  Keep our office informed of any change in treatment plan per Psychiatry

## 2018-06-26 ENCOUNTER — PROCEDURE VISIT (OUTPATIENT)
Dept: INTERNAL MEDICINE CLINIC | Facility: CLINIC | Age: 38
End: 2018-06-26

## 2018-06-26 VITALS
WEIGHT: 145.6 LBS | BODY MASS INDEX: 18.68 KG/M2 | DIASTOLIC BLOOD PRESSURE: 74 MMHG | SYSTOLIC BLOOD PRESSURE: 100 MMHG | OXYGEN SATURATION: 96 % | HEIGHT: 74 IN | RESPIRATION RATE: 20 BRPM | TEMPERATURE: 98.3 F | HEART RATE: 100 BPM

## 2018-06-26 DIAGNOSIS — L72.9 BENIGN CYST OF SKIN: Primary | ICD-10-CM

## 2018-06-26 NOTE — PROGRESS NOTES
Incision and Drainage  Date/Time: 6/26/2018 4:26 PM  Performed by: Carrillo Galindo  Authorized by: Carrillo Galindo     Patient location:  Clinic  Other Assisting Provider: No    Consent:     Consent obtained:  Written    Consent given by:  Patient    Risks discussed:  Bleeding, incomplete drainage, pain and infection    Alternatives discussed:  Observation  [place] protocol:     Patient identity confirmed:  Verbally with patient  Location:     Type:  Cyst    Size:  Dime size    Location:  Trunk    Trunk location:  Back  Pre-procedure details:     Skin preparation:  Antiseptic wash  Anesthesia (see MAR for exact dosages): Anesthesia method:  Local infiltration    Local anesthetic:  Bupivacaine 0 25% w/o epi and lidocaine 1% WITH epi  Procedure details:     Complexity:  Simple    Needle aspiration: no      Incision types:  Single straight    Scalpel blade:  10    Approach:  Open    Incision depth:  Skin    Wound management:  Probed and deloculated    Drainage:  Serous and serosanguinous    Drainage amount:  Scant  Post-procedure details:     Patient tolerance of procedure: Tolerated well, no immediate complications    Complication (if applicable):  Closed with 3 0 running suture  Comments:      Post care instructions discussed, dressed with gauze and tegaderm, return tomorrow for wound check  Suture removal in 7-10 days   Procedure was cyst removal in its entirety

## 2018-06-27 ENCOUNTER — OFFICE VISIT (OUTPATIENT)
Dept: INTERNAL MEDICINE CLINIC | Facility: CLINIC | Age: 38
End: 2018-06-27

## 2018-06-27 VITALS
BODY MASS INDEX: 19.56 KG/M2 | RESPIRATION RATE: 20 BRPM | HEART RATE: 96 BPM | WEIGHT: 147.6 LBS | SYSTOLIC BLOOD PRESSURE: 120 MMHG | OXYGEN SATURATION: 98 % | HEIGHT: 73 IN | DIASTOLIC BLOOD PRESSURE: 78 MMHG | TEMPERATURE: 97.6 F

## 2018-06-27 DIAGNOSIS — L72.9 SKIN CYST: Primary | ICD-10-CM

## 2018-06-27 NOTE — PROGRESS NOTES
Assessment/Plan:    Dressing changed to upper back  Incision healing well  Pt to return for follow-up on Friday with Dr Comfort Stanford  Sutures to be removed 7-10 days post placement     Diagnoses and all orders for this visit:    Skin cyst        Subjective:      Patient ID: Kei Smith is a 41 y o  male  HPI    Pt presents for a 1 day dressing change  He is unable to change dressing due to location  He is doing well with no concerns  Denies fever/chills  The following portions of the patient's history were reviewed and updated as appropriate: allergies, current medications, past family history, past medical history, past social history, past surgical history and problem list     Review of Systems   Constitutional: Negative for appetite change, chills and fever  Skin: Positive for wound  Negative for color change and rash  Past Medical History:   Diagnosis Date    Allergic     Anxiety     Depression     Dry eye     resolved 12/16/2016    Epididymitis, left     Hydrocele, acquired     resolved 11/19/2015 last assessed 03/09/2016    [facility]izo[facility]affective schizophrenia ([facility] [address] [facility]     TSH elevation     resolved 09/14/2017         Current Outpatient Prescriptions:     buPROPion (WELLBUTRIN SR) 150 mg 12 hr tablet, Take 1 tablet (150 mg total) by mouth 2 (two) times a day, Disp: 180 tablet, Rfl: 3    Cetirizine HCl (ZYRTEC ALLERGY) 10 MG CAPS, Take 1 tablet by mouth daily, Disp: , Rfl:     cycloSPORINE (RESTASIS) 0 05 % ophthalmic emulsion, Apply 1 drop to eye 2 (two) times a day, Disp: , Rfl:     Multiple Vitamin (MULTIVITAMIN) tablet, Take 1 tablet by mouth daily  , Disp: , Rfl:     naproxen (NAPROSYN) 250 mg tablet, Take 250 mg by mouth 2 (two) times a day with meals  , Disp: , Rfl:     risperiDONE (RisperDAL) 4 mg tablet, Take 4 mg by mouth daily  , Disp: , Rfl:     valproic acid (DEPAKENE) 250 mg capsule, Take 250 mg by mouth 3 (three) times a day Indications: ER , Disp: , Rfl:     Allergies Allergen Reactions    Lithium Carbonate [Lithium] Other (See Comments)     acne       Social History   Past Surgical History:   Procedure Laterality Date    NO PAST SURGERIES      denied history of prior surgery     Family History   Problem Relation Age of Onset    Brain cancer Mother     Cancer Mother         glioblastoma    Diabetes type I Father     COPD Father     Diabetes type I Sister         with complications       Objective:  /78 (BP Location: Left arm, Patient Position: Sitting, Cuff Size: Adult)   Pulse 96   Temp 97 6 °F (36 4 °C) (Oral)   Resp 20   Ht 6' 1" (1 854 m) Comment: with shoes on  Wt 67 kg (147 lb 9 6 oz) Comment: with shoes on  SpO2 98% Comment: room air  BMI 19 47 kg/m²      Physical Exam   Constitutional: He is oriented to person, place, and time  He appears well-developed and well-nourished  Pulmonary/Chest: Effort normal  No respiratory distress  Neurological: He is alert and oriented to person, place, and time  Skin:   Well healing wound to upper back  Sutures in place  No drainage no erythema  Psychiatric: He has a normal mood and affect   His behavior is normal  Judgment and thought content normal

## 2018-07-05 ENCOUNTER — OFFICE VISIT (OUTPATIENT)
Dept: INTERNAL MEDICINE CLINIC | Facility: CLINIC | Age: 38
End: 2018-07-05
Payer: MEDICARE

## 2018-07-05 VITALS
WEIGHT: 148.2 LBS | DIASTOLIC BLOOD PRESSURE: 80 MMHG | OXYGEN SATURATION: 98 % | HEIGHT: 74 IN | RESPIRATION RATE: 16 BRPM | TEMPERATURE: 98.4 F | HEART RATE: 84 BPM | BODY MASS INDEX: 19.02 KG/M2 | SYSTOLIC BLOOD PRESSURE: 116 MMHG

## 2018-07-05 DIAGNOSIS — L72.9 SKIN CYST: Primary | ICD-10-CM

## 2018-07-05 DIAGNOSIS — Z48.02 VISIT FOR SUTURE REMOVAL: ICD-10-CM

## 2018-07-05 PROCEDURE — 99213 OFFICE O/P EST LOW 20 MIN: CPT | Performed by: INTERNAL MEDICINE

## 2018-07-05 RX ORDER — LORATADINE 10 MG/1
10 TABLET ORAL DAILY
COMMUNITY

## 2018-07-05 RX ORDER — IBUPROFEN 200 MG
200 TABLET ORAL AS NEEDED
COMMUNITY

## 2018-07-05 NOTE — PROGRESS NOTES
Assessment/Plan:    Visit for suture removal    Sutures removed, tolerated procedure well  No active signs of infection  No need for antibiotic therapy at this time  Continue to monitor  Diagnoses and all orders for this visit:    Skin cyst    Visit for suture removal    Other orders  -     loratadine (CLARITIN) 10 mg tablet; Take 10 mg by mouth daily  -     ibuprofen (ADVIL) 200 mg tablet; Take 200 mg by mouth as needed for mild pain  -     Suture removal          Subjective:      Patient ID: Idris Garrett is a 41 y o  male  69-year-old male is seen today for follow-up status post incision and drainage of cyst left upper back  Incision and drainage was performed on 06/26/2018, sutures were placed  No antibiotics were needed postprocedurally  No active complaints at this time  The following portions of the patient's history were reviewed and updated as appropriate: allergies, current medications, past family history, past medical history, past social history, past surgical history and problem list     Review of Systems   Constitutional: Negative for activity change, appetite change, chills, diaphoresis, fatigue and fever  HENT: Negative for congestion, postnasal drip, rhinorrhea, sinus pain, sinus pressure, sneezing and sore throat  Eyes: Negative for visual disturbance  Respiratory: Negative for apnea, cough, choking, chest tightness, shortness of breath and wheezing  Cardiovascular: Negative for chest pain, palpitations and leg swelling  Gastrointestinal: Negative for abdominal distention, abdominal pain, anal bleeding, blood in stool, constipation, diarrhea, nausea and vomiting  Endocrine: Negative for cold intolerance and heat intolerance  Genitourinary: Negative for difficulty urinating, dysuria and hematuria  Musculoskeletal: Negative  Skin: Negative  Neurological: Negative for dizziness, weakness, light-headedness, numbness and headaches     Hematological: Negative for adenopathy  Psychiatric/Behavioral: Negative for agitation, sleep disturbance and suicidal ideas  All other systems reviewed and are negative  Past Medical History:   Diagnosis Date    Allergic     Anxiety     Depression     Dry eye     resolved 12/16/2016    Epididymitis, left     Hydrocele, acquired     resolved 11/19/2015 last assessed 03/09/2016    Schizo-affective schizophrenia ([address] )     TSH elevation     resolved 09/14/2017         Current Outpatient Prescriptions:     buPROPion (WELLBUTRIN SR) 150 mg 12 hr tablet, Take 1 tablet (150 mg total) by mouth 2 (two) times a day, Disp: 180 tablet, Rfl: 3    cycloSPORINE (RESTASIS) 0 05 % ophthalmic emulsion, Apply 1 drop to eye 2 (two) times a day, Disp: , Rfl:     ibuprofen (ADVIL) 200 mg tablet, Take 200 mg by mouth as needed for mild pain, Disp: , Rfl:     loratadine (CLARITIN) 10 mg tablet, Take 10 mg by mouth daily, Disp: , Rfl:     Multiple Vitamin (MULTIVITAMIN) tablet, Take 1 tablet by mouth daily  , Disp: , Rfl:     risperiDONE (RisperDAL) 4 mg tablet, Take 4 mg by mouth daily  , Disp: , Rfl:     valproic acid (DEPAKENE) 250 mg capsule, Take 250 mg by mouth 3 (three) times a day Indications: ER , Disp: , Rfl:     Allergies   Allergen Reactions    Lithium Carbonate [Lithium] Other (See Comments)     acne       Social History   Past Surgical History:   Procedure Laterality Date    NO PAST SURGERIES      denied history of prior surgery     Family History   Problem Relation Age of Onset    Brain cancer Mother     Cancer Mother         glioblastoma    Diabetes type I Father     COPD Father     Diabetes type I Sister         with complications       Objective:  /80 (BP Location: Right arm, Patient Position: Sitting, Cuff Size: Standard)   Pulse 84   Temp 98 4 °F (36 9 °C) (Oral)   Resp 16   Ht 6' 1 5" (1 867 m)   Wt 67 2 kg (148 lb 3 2 oz)   SpO2 98%   BMI 19 29 kg/m²     No results found for this or any previous visit (from the past 1344 hour(s))  Physical Exam   Constitutional: He is oriented to person, place, and time  He appears well-developed and well-nourished  No distress  HENT:   Head: Normocephalic and atraumatic  Eyes: Conjunctivae and EOM are normal  Pupils are equal, round, and reactive to light  Right eye exhibits no discharge  Left eye exhibits no discharge  No scleral icterus  Neck: Normal range of motion  Neck supple  No JVD present  No thyromegaly present  Cardiovascular: Normal rate, regular rhythm, normal heart sounds and intact distal pulses  Exam reveals no gallop and no friction rub  No murmur heard  Pulmonary/Chest: Effort normal and breath sounds normal  No respiratory distress  He has no wheezes  He has no rales  He exhibits no tenderness  Abdominal: Soft  Bowel sounds are normal  He exhibits no distension and no mass  There is no tenderness  There is no rebound and no guarding  Musculoskeletal: Normal range of motion  He exhibits no edema, tenderness or deformity  Lymphadenopathy:     He has no cervical adenopathy  Neurological: He is alert and oriented to person, place, and time  He has normal reflexes  No cranial nerve deficit  Coordination normal    Skin: Skin is warm and dry  No rash noted  He is not diaphoretic  No erythema  No pallor  Psychiatric: He has a normal mood and affect  His behavior is normal  Judgment and thought content normal    Nursing note and vitals reviewed          Suture removal  Date/Time: 7/5/2018 11:28 AM  Performed by: Suresh Key by: Stacy Barnes     Patient location:  Clinic  Consent:     Consent obtained:  Verbal    Consent given by:  Patient    Risks discussed:  Bleeding, pain and wound separation    Alternatives discussed:  No treatment  Universal protocol:     Procedure explained and questions answered to patient or proxy's satisfaction: yes      Patient identity confirmed:  Verbally with patient  Location: Laterality:  Left    Location: Upper back  Procedure details: Tools used:  Scissors and tweezers    Wound appearance:  No sign(s) of infection, good wound healing and clean  Post-procedure details:     Post-removal:  No dressing applied    Patient tolerance of procedure:   Tolerated well, no immediate complications

## 2018-07-05 NOTE — ASSESSMENT & PLAN NOTE
Sutures removed, tolerated procedure well  No active signs of infection  No need for antibiotic therapy at this time  Continue to monitor

## 2018-08-13 ENCOUNTER — OFFICE VISIT (OUTPATIENT)
Dept: INTERNAL MEDICINE CLINIC | Facility: CLINIC | Age: 38
End: 2018-08-13
Payer: MEDICARE

## 2018-08-13 VITALS
DIASTOLIC BLOOD PRESSURE: 78 MMHG | WEIGHT: 150 LBS | TEMPERATURE: 98.3 F | SYSTOLIC BLOOD PRESSURE: 132 MMHG | HEIGHT: 72 IN | HEART RATE: 101 BPM | BODY MASS INDEX: 20.32 KG/M2 | OXYGEN SATURATION: 98 %

## 2018-08-13 DIAGNOSIS — R20.0 NUMBNESS OF FINGER: Primary | ICD-10-CM

## 2018-08-13 PROCEDURE — 99213 OFFICE O/P EST LOW 20 MIN: CPT | Performed by: NURSE PRACTITIONER

## 2018-08-13 NOTE — PROGRESS NOTES
Assessment/Plan:    No problem-specific Assessment & Plan notes found for this encounter  Diagnoses and all orders for this visit:    Numbness of finger      This appears to be self-limiting in nature  There is no deficit in finger strength, there is normal capillary refill, no lacerations or physical changes to finger visualized  The finger has full ROM  Monitor it over time and if symptoms worsen, follow-up  Subjective:      Patient ID: Merril Ganser is a 41 y o  male  Patient presents for an acute visit  He reports that he has a bicycle and a bicycle scale that he uses to weigh the bike  He reports that 7 days ago,  he had to hoist the scale upwards with a wire in order for it to register the weight of his bike and it caused his right 2nd and 3rd fingers to become numb due to length of time he had a hold it  He reports that his 3rd finger has regained sensation, however his 2nd finger remains numb and he is concerned by this  He describes the sensation as tingling and he is unsure if it has been improving for the past week or if it is been remaining the same  He reports that the sensation becomes painful when he is washing dishes and he feels that it should be improving as his 3rd finger had  He denies limited or change in range of motion  He feels that his strength is the same in this finger, as well  The following portions of the patient's history were reviewed and updated as appropriate: allergies, current medications, past family history, past medical history, past social history, past surgical history and problem list     Review of Systems   Constitutional: Negative for chills and fever  HENT: Negative for trouble swallowing  Eyes: Negative for pain  Respiratory: Negative for shortness of breath  Cardiovascular: Negative for chest pain  Gastrointestinal: Negative for abdominal pain, diarrhea, nausea and vomiting  Genitourinary: Negative for dysuria  Musculoskeletal: Negative for back pain  Skin: Negative for rash  Neurological: Positive for numbness (per HPI)  Negative for headaches  Psychiatric/Behavioral: The patient is nervous/anxious (at baseline, per patient)  Past Medical History:   Diagnosis Date    Allergic     Anxiety     Depression     Dry eye     resolved 12/16/2016    Epididymitis, left     Hydrocele, acquired     resolved 11/19/2015 last assessed 03/09/2016    Schizo-affective schizophrenia ([facility] [address] )     TSH elevation     resolved 09/14/2017         Current Outpatient Prescriptions:     buPROPion (WELLBUTRIN SR) 150 mg 12 hr tablet, Take 1 tablet (150 mg total) by mouth 2 (two) times a day, Disp: 180 tablet, Rfl: 3    cycloSPORINE (RESTASIS) 0 05 % ophthalmic emulsion, Apply 1 drop to eye 2 (two) times a day, Disp: , Rfl:     ibuprofen (ADVIL) 200 mg tablet, Take 200 mg by mouth as needed for mild pain, Disp: , Rfl:     loratadine (CLARITIN) 10 mg tablet, Take 10 mg by mouth daily, Disp: , Rfl:     Multiple Vitamin (MULTIVITAMIN) tablet, Take 1 tablet by mouth daily  , Disp: , Rfl:     risperiDONE (RisperDAL) 4 mg tablet, Take 4 mg by mouth daily  , Disp: , Rfl:     valproic acid (DEPAKENE) 250 mg capsule, Take 250 mg by mouth 3 (three) times a day Indications: ER , Disp: , Rfl:     Allergies   Allergen Reactions    Lithium Carbonate [Lithium] Other (See Comments)     acne       Social History   Past Surgical History:   Procedure Laterality Date    NO PAST SURGERIES      denied history of prior surgery     Family History   Problem Relation Age of Onset    Brain cancer Mother     Cancer Mother         glioblastoma    Diabetes type I Father     COPD Father     Diabetes type I Sister         with complications       Objective:  /78 (BP Location: Left arm, Patient Position: Sitting, Cuff Size: Adult)   Pulse 101   Temp 98 3 °F (36 8 °C) (Oral)   Ht 6' (1 829 m)   Wt 68 kg (150 lb)   SpO2 98% Comment: room air BMI 20 34 kg/m²        Physical Exam   Constitutional: He is oriented to person, place, and time  He appears well-developed and well-nourished  No distress  HENT:   Head: Normocephalic and atraumatic  Right Ear: External ear normal    Left Ear: External ear normal    Eyes: Conjunctivae are normal  Pupils are equal, round, and reactive to light  No scleral icterus  Neck: Normal range of motion  Neck supple  Cardiovascular: Normal rate, regular rhythm and normal heart sounds  Pulmonary/Chest: Effort normal and breath sounds normal  No respiratory distress  Abdominal: Soft  Bowel sounds are normal    Musculoskeletal: Normal range of motion  He exhibits no edema  Right hand: He exhibits normal range of motion, no tenderness, no bony tenderness, normal two-point discrimination, normal capillary refill, no deformity, no laceration and no swelling  Decreased sensation noted  Normal strength noted  Hands:  Neurological: He is alert and oriented to person, place, and time  Skin: Skin is warm and dry  Psychiatric: He has a normal mood and affect  His behavior is normal  Judgment and thought content normal    Vitals reviewed

## 2018-08-21 ENCOUNTER — CLINICAL SUPPORT (OUTPATIENT)
Dept: INTERNAL MEDICINE CLINIC | Facility: CLINIC | Age: 38
End: 2018-08-21
Payer: MEDICARE

## 2018-08-21 DIAGNOSIS — F41.9 ANXIETY: Primary | ICD-10-CM

## 2018-08-21 DIAGNOSIS — Z13.1 ENCOUNTER FOR SCREENING EXAMINATION FOR IMPAIRED GLUCOSE REGULATION AND DIABETES MELLITUS: ICD-10-CM

## 2018-08-21 DIAGNOSIS — Z13.220 SCREENING, LIPID: ICD-10-CM

## 2018-08-21 LAB
ALBUMIN SERPL BCP-MCNC: 4 G/DL (ref 3.5–5)
ALP SERPL-CCNC: 52 U/L (ref 46–116)
ALT SERPL W P-5'-P-CCNC: 27 U/L (ref 12–78)
ANION GAP SERPL CALCULATED.3IONS-SCNC: 9 MMOL/L (ref 4–13)
AST SERPL W P-5'-P-CCNC: 14 U/L (ref 5–45)
BASOPHILS # BLD AUTO: 0.08 THOUSANDS/ΜL (ref 0–0.1)
BASOPHILS NFR BLD AUTO: 2 % (ref 0–1)
BILIRUB SERPL-MCNC: 0.46 MG/DL (ref 0.2–1)
BUN SERPL-MCNC: 16 MG/DL (ref 5–25)
CALCIUM SERPL-MCNC: 9 MG/DL (ref 8.3–10.1)
CHLORIDE SERPL-SCNC: 103 MMOL/L (ref 100–108)
CHOLEST SERPL-MCNC: 155 MG/DL (ref 50–200)
CO2 SERPL-SCNC: 29 MMOL/L (ref 21–32)
CREAT SERPL-MCNC: 0.97 MG/DL (ref 0.6–1.3)
EOSINOPHIL # BLD AUTO: 0.24 THOUSAND/ΜL (ref 0–0.61)
EOSINOPHIL NFR BLD AUTO: 5 % (ref 0–6)
ERYTHROCYTE [DISTWIDTH] IN BLOOD BY AUTOMATED COUNT: 11.9 % (ref 11.6–15.1)
GFR SERPL CREATININE-BSD FRML MDRD: 99 ML/MIN/1.73SQ M
GLUCOSE P FAST SERPL-MCNC: 81 MG/DL (ref 65–99)
HCT VFR BLD AUTO: 46 % (ref 36.5–49.3)
HDLC SERPL-MCNC: 59 MG/DL (ref 40–60)
HGB BLD-MCNC: 14.9 G/DL (ref 12–17)
IMM GRANULOCYTES # BLD AUTO: 0.02 THOUSAND/UL (ref 0–0.2)
IMM GRANULOCYTES NFR BLD AUTO: 0 % (ref 0–2)
LDLC SERPL CALC-MCNC: 80 MG/DL (ref 0–100)
LYMPHOCYTES # BLD AUTO: 1.63 THOUSANDS/ΜL (ref 0.6–4.47)
LYMPHOCYTES NFR BLD AUTO: 31 % (ref 14–44)
MCH RBC QN AUTO: 29.6 PG (ref 26.8–34.3)
MCHC RBC AUTO-ENTMCNC: 32.4 G/DL (ref 31.4–37.4)
MCV RBC AUTO: 91 FL (ref 82–98)
MONOCYTES # BLD AUTO: 0.79 THOUSAND/ΜL (ref 0.17–1.22)
MONOCYTES NFR BLD AUTO: 15 % (ref 4–12)
NEUTROPHILS # BLD AUTO: 2.52 THOUSANDS/ΜL (ref 1.85–7.62)
NEUTS SEG NFR BLD AUTO: 47 % (ref 43–75)
NRBC BLD AUTO-RTO: 0 /100 WBCS
PLATELET # BLD AUTO: 224 THOUSANDS/UL (ref 149–390)
PMV BLD AUTO: 11.2 FL (ref 8.9–12.7)
POTASSIUM SERPL-SCNC: 4 MMOL/L (ref 3.5–5.3)
PROT SERPL-MCNC: 6.6 G/DL (ref 6.4–8.2)
RBC # BLD AUTO: 5.04 MILLION/UL (ref 3.88–5.62)
SODIUM SERPL-SCNC: 141 MMOL/L (ref 136–145)
TRIGL SERPL-MCNC: 82 MG/DL
TSH SERPL DL<=0.05 MIU/L-ACNC: 4.58 UIU/ML (ref 0.36–3.74)
WBC # BLD AUTO: 5.28 THOUSAND/UL (ref 4.31–10.16)

## 2018-08-21 PROCEDURE — 84443 ASSAY THYROID STIM HORMONE: CPT | Performed by: PHYSICIAN ASSISTANT

## 2018-08-21 PROCEDURE — 80053 COMPREHEN METABOLIC PANEL: CPT | Performed by: PHYSICIAN ASSISTANT

## 2018-08-21 PROCEDURE — 36415 COLL VENOUS BLD VENIPUNCTURE: CPT

## 2018-08-21 PROCEDURE — 80061 LIPID PANEL: CPT | Performed by: PHYSICIAN ASSISTANT

## 2018-08-21 PROCEDURE — 85025 COMPLETE CBC W/AUTO DIFF WBC: CPT | Performed by: PHYSICIAN ASSISTANT

## 2018-08-24 ENCOUNTER — OFFICE VISIT (OUTPATIENT)
Dept: INTERNAL MEDICINE CLINIC | Facility: CLINIC | Age: 38
End: 2018-08-24
Payer: MEDICARE

## 2018-08-24 VITALS
HEIGHT: 73 IN | HEART RATE: 100 BPM | OXYGEN SATURATION: 98 % | SYSTOLIC BLOOD PRESSURE: 104 MMHG | TEMPERATURE: 98.3 F | BODY MASS INDEX: 19.38 KG/M2 | WEIGHT: 146.2 LBS | DIASTOLIC BLOOD PRESSURE: 76 MMHG

## 2018-08-24 DIAGNOSIS — R94.6 THYROID FUNCTION TEST ABNORMAL: ICD-10-CM

## 2018-08-24 DIAGNOSIS — F41.9 ANXIETY: ICD-10-CM

## 2018-08-24 DIAGNOSIS — Z87.891 DISCONTINUED SMOKING: ICD-10-CM

## 2018-08-24 DIAGNOSIS — J30.2 SEASONAL ALLERGIC RHINITIS, UNSPECIFIED TRIGGER: Primary | ICD-10-CM

## 2018-08-24 PROBLEM — Z48.02 VISIT FOR SUTURE REMOVAL: Status: RESOLVED | Noted: 2018-07-05 | Resolved: 2018-08-24

## 2018-08-24 PROBLEM — K52.9 GASTROENTERITIS: Status: RESOLVED | Noted: 2018-05-21 | Resolved: 2018-08-24

## 2018-08-24 PROCEDURE — 99213 OFFICE O/P EST LOW 20 MIN: CPT | Performed by: PHYSICIAN ASSISTANT

## 2018-08-24 RX ORDER — BUPROPION HYDROCHLORIDE 150 MG/1
150 TABLET, EXTENDED RELEASE ORAL DAILY
Qty: 20 TABLET | Refills: 0 | Status: SHIPPED | OUTPATIENT
Start: 2018-08-24 | End: 2018-08-24 | Stop reason: ALTCHOICE

## 2018-08-24 NOTE — ASSESSMENT & PLAN NOTE
Patient has been using Claritin during his seasonal allergy time of the year  Discussed that if his symptoms are well controlled and he no longer is having symptoms he can try a  Off of Claritin  May only need to be used during seasonal allergy season

## 2018-08-24 NOTE — PATIENT INSTRUCTIONS
Thyroid function test abnormal  Slightly elevated TSH  Will repeat TSH, T4, T3 in 8 weeks  Seasonal allergies  Patient has been using Claritin during his seasonal allergy time of the year  Discussed that if his symptoms are well controlled and he no longer is having symptoms he can try a  Off of Claritin  May only need to be used during seasonal allergy season  Anxiety   Stable for some time on valproic acid and risperidone  Follows with Psychiatry [address] was added  Roughly 2 years ago to help with smoking cessation  Patient has been successful in smoking cessation and has been without tobacco for 2 years  He is interested in discontinuing this medication which I agree with  Reduce Wellbutrin from 150 mg twice daily to Wellbutrin 150 mg by mouth once daily for 20 days then discontinue Wellbutrin completely  Patient is unsure if he has sufficient amounts of Wellbutrin at home to make this taper, for this reason refill was sent to pharmacy    Medication will be removed from patient's medication list

## 2018-08-24 NOTE — PROGRESS NOTES
[facility] [place]  Patient ID: Agata Khan III    : 1980  Age/Gender: 41 y o  male     DATE: 2018      Assessment/Plan:    Thyroid function test abnormal  Slightly elevated TSH  Will repeat TSH, T4, T3 in 8 weeks  Seasonal allergies  Patient has been using Claritin during his seasonal allergy time of the year  Discussed that if his symptoms are well controlled and he no longer is having symptoms he can try a  Off of Claritin  May only need to be used during seasonal allergy season  Anxiety   Stable for some time on valproic acid and risperidone  Follows with Psychiatry [facility] who monitors his levels  Wellbutrin was added  Roughly 2 years ago to help with smoking cessation  Patient has been successful in smoking cessation and has been without tobacco for 2 years  He is interested in discontinuing this medication which I agree with  Reduce Wellbutrin from 150 mg twice daily to Wellbutrin 150 mg by mouth once daily for 20 days then discontinue Wellbutrin completely  Patient is unsure if he has sufficient amounts of Wellbutrin at home to make this taper, for this reason refill was sent to pharmacy  Medication will be removed from patient's medication list    Report back if any new or changing symptoms occur during this taper and discontinuation       Diagnoses and all orders for this visit:    Seasonal allergic rhinitis, unspecified trigger    Discontinued smoking  -     Discontinue: buPROPion (WELLBUTRIN SR) 150 mg 12 hr tablet; Take 1 tablet (150 mg total) by mouth daily for 20 days    Thyroid function test abnormal  -     TSH, 3rd generation; Future  -     T4, free; Future  -     T3; Future    Anxiety          Subjective:   Chief Complaint   Patient presents with    Follow-up     Pt is here for a 3 month follow up for his GERD  Review labs from 18  Idris Garrett is a 41 y o  male who presents to the office on 8/24/2018 for     Patient for follow up  He notes this summer he has been trying to exercise and me more active  Follow up labs, Wellbutrin  Taking Wellbutrin for a little over 2 years, has been without smoking for about 2 years  No cravings  Notes he [address] 5 miles  Trying to get more into biking and wants to get involved in bike co-op  Follows with the [place]  HE had his last psychiatrist appointment about 1 year ago  Schedules 4-6 months out but then gets rescheduled  His social workup is on maturity leave  Started weight lifting, trying to get back in shape  he notes he was started on Claritin earlier this summer but does not feel he needs any more medications for his allergies  Wants to try some time off of this  Anxiety   Patient reports no chest pain, nausea, palpitations or shortness of breath  Depression   The problem occurs intermittently  The problem has been resolved  Pertinent negatives include no abdominal pain, anorexia, arthralgias, chest pain, chills, coughing, fatigue, fever, headaches, nausea, numbness, visual change or vomiting  Sinusitis   This is a recurrent problem  The problem has been resolved since onset  There has been no fever  He is experiencing no pain  Pertinent negatives include no chills, coughing, headaches, shortness of breath or sinus pressure  The following portions of the patient's history were reviewed and updated as appropriate: allergies, current medications, past family history, past medical history, past social history, past surgical history and problem list     Review of Systems   Constitutional: Negative for chills, fatigue and fever  HENT: Negative for sinus pressure  Respiratory: Negative for cough, shortness of breath and wheezing  Cardiovascular: Negative for chest pain and palpitations  Gastrointestinal: Negative for abdominal pain, anorexia, nausea and vomiting     Genitourinary: Negative for dysuria  Musculoskeletal: Negative for arthralgias  Neurological: Negative for numbness and headaches  Psychiatric/Behavioral: Positive for depression  Patient Active Problem List   Diagnosis    Anxiety    Depression    Arthralgia of left knee    Chronic sinusitis    Lung nodule    Seasonal allergies    Thyroid function test abnormal    Skin cyst       Past Medical History:   Diagnosis Date    Allergic     Anxiety     Depression     Dry eye     resolved 12/16/2016    Epididymitis, left     Hydrocele, acquired     resolved 11/19/2015 last assessed 03/09/2016    Schizo-affective schizophrenia ([facility] [address] [facility]     TSH elevation     resolved 09/14/2017       Past Surgical History:   Procedure Laterality Date    NO PAST SURGERIES      denied history of prior surgery    SKIN BIOPSY           Current Outpatient Prescriptions:     cycloSPORINE (RESTASIS) 0 05 % ophthalmic emulsion, Apply 1 drop to eye 2 (two) times a day, Disp: , Rfl:     ibuprofen (ADVIL) 200 mg tablet, Take 200 mg by mouth as needed for mild pain, Disp: , Rfl:     loratadine (CLARITIN) 10 mg tablet, Take 10 mg by mouth daily, Disp: , Rfl:     Multiple Vitamin (MULTIVITAMIN) tablet, Take 1 tablet by mouth daily  , Disp: , Rfl:     risperiDONE (RisperDAL) 4 mg tablet, Take 4 mg by mouth daily  , Disp: , Rfl:     valproic acid (DEPAKENE) 250 mg capsule, Take 250 mg by mouth 3 (three) times a day Indications: ER , Disp: , Rfl:     Allergies   Allergen Reactions    Lithium Carbonate [Lithium] Other (See Comments)     acne       Social History     Social History    Marital status: Single     Spouse name: N/A    Number of children: N/A    Years of education: N/A     Social History Main Topics    Smoking status: Former Smoker     Packs/day: 1 00     Quit date: 2016    Smokeless tobacco: Never Used      Comment: quit 5/27/2016, recently stopped smoking last day of smoking 3/2/16  per [employer]    Alcohol use No    Drug use: No      Comment: denied history of iv drugs, denied uses marijuana per allscripts    Sexual activity: Not Asked     Other Topics Concern    None     Social History Narrative    None       Family History   Problem Relation Age of Onset    Brain cancer Mother     Cancer Mother         glioblastoma    Diabetes type I Father     COPD Father     Diabetes type I Sister         with complications       PHQ-9 Depression Screening    PHQ-9:    Frequency of the following problems over the past two weeks:              Health Maintenance   Topic Date Due    DTaP,Tdap,and Td Vaccines (1 - Tdap) 04/25/2002    INFLUENZA VACCINE  09/01/2018       Immunization History   Administered Date(s) Administered    Influenza 10/21/2015    Influenza Quadrivalent Preservative Free 3 years and older IM 10/21/2015, 09/14/2017        Objective:  Vitals:    08/24/18 1302   BP: 104/76   BP Location: Left arm   Patient Position: Sitting   Cuff Size: Adult   Pulse: 100   Temp: 98 3 °F (36 8 °C)   TempSrc: Oral   SpO2: 98%   Weight: 66 3 kg (146 lb 3 2 oz)   Height: 6' 1" (1 854 m)     Wt Readings from Last 3 Encounters:   08/24/18 66 3 kg (146 lb 3 2 oz)   08/13/18 68 kg (150 lb)   07/05/18 67 2 kg (148 lb 3 2 oz)     Body mass index is 19 29 kg/m²  No exam data present       Physical Exam   Constitutional: He is oriented to person, place, and time  He appears well-developed and well-nourished  No distress  Alert pleasant cooperative  Seated in room in no acute distress   HENT:   Head: Normocephalic and atraumatic  Right Ear: External ear normal    Left Ear: External ear normal    Mouth/Throat: Oropharynx is clear and moist  No oropharyngeal exudate  Moist mucous membranes  Normal posterior pharynx   Eyes: Pupils are equal, round, and reactive to light  Right eye exhibits no discharge  Left eye exhibits no discharge  No scleral icterus  Pupils reactive   Neck: Neck supple     Cardiovascular: Normal rate, regular rhythm and normal heart sounds  No murmur heard  Pulmonary/Chest: Effort normal and breath sounds normal  No respiratory distress  He has no wheezes  He has no rales  Clear to auscultation throughout  No crackles no rhonchi no wheeze  No respiratory distress   Abdominal: Soft  Bowel sounds are normal  There is no tenderness  Soft nontender  Positive bowel sounds   Musculoskeletal: He exhibits no edema  Neurological: He is alert and oriented to person, place, and time  Skin: Skin is warm and dry  He is not diaphoretic  Psychiatric: He has a normal mood and affect  Thought content normal  His mood appears not anxious  He is not agitated and not aggressive  He does not exhibit a depressed mood  Good eye contact  Talkative, cooperative   Nursing note and vitals reviewed            Future Appointments  Date Time Provider Chata Salazar   11/30/2018 2:15 PM Carlos Coelho PA-C [address] [place] [address]    Patient Care Team:  Phuong Lopez MD as PCP - General  Lindell Pallas, MD Lisle Lame, PA-C

## 2018-08-24 NOTE — ASSESSMENT & PLAN NOTE
Stable for some time on valproic acid and risperidone  Follows with Psychiatry [facility] who monitors his levels  Wellbutrin was added  Roughly 2 years ago to help with smoking cessation  Patient has been successful in smoking cessation and has been without tobacco for 2 years  He is interested in discontinuing this medication which I agree with  Reduce Wellbutrin from 150 mg twice daily to Wellbutrin 150 mg by mouth once daily for 20 days then discontinue Wellbutrin completely  Patient is unsure if he has sufficient amounts of Wellbutrin at home to make this taper, for this reason refill was sent to pharmacy    Medication will be removed from patient's medication list    Report back if any new or changing symptoms occur during this taper and discontinuation

## 2018-11-27 ENCOUNTER — CLINICAL SUPPORT (OUTPATIENT)
Dept: INTERNAL MEDICINE CLINIC | Facility: CLINIC | Age: 38
End: 2018-11-27
Payer: MEDICARE

## 2018-11-27 DIAGNOSIS — R94.6 THYROID FUNCTION TEST ABNORMAL: Primary | ICD-10-CM

## 2018-11-27 LAB
T3 SERPL-MCNC: 0.5 NG/ML (ref 0.6–1.8)
T4 FREE SERPL-MCNC: 0.75 NG/DL (ref 0.76–1.46)
TSH SERPL DL<=0.05 MIU/L-ACNC: 2.12 UIU/ML (ref 0.36–3.74)

## 2018-11-27 PROCEDURE — 84439 ASSAY OF FREE THYROXINE: CPT | Performed by: PHYSICIAN ASSISTANT

## 2018-11-27 PROCEDURE — 36415 COLL VENOUS BLD VENIPUNCTURE: CPT

## 2018-11-27 PROCEDURE — 84443 ASSAY THYROID STIM HORMONE: CPT | Performed by: PHYSICIAN ASSISTANT

## 2018-11-27 PROCEDURE — 84480 ASSAY TRIIODOTHYRONINE (T3): CPT | Performed by: PHYSICIAN ASSISTANT

## 2018-12-04 ENCOUNTER — CLINICAL SUPPORT (OUTPATIENT)
Dept: INTERNAL MEDICINE CLINIC | Facility: CLINIC | Age: 38
End: 2018-12-04
Payer: MEDICARE

## 2018-12-04 DIAGNOSIS — Z23 ENCOUNTER FOR IMMUNIZATION: ICD-10-CM

## 2018-12-04 PROCEDURE — 90682 RIV4 VACC RECOMBINANT DNA IM: CPT

## 2018-12-04 PROCEDURE — 90471 IMMUNIZATION ADMIN: CPT

## 2019-01-03 ENCOUNTER — TELEPHONE (OUTPATIENT)
Dept: UROLOGY | Facility: AMBULATORY SURGERY CENTER | Age: 39
End: 2019-01-03

## 2019-01-03 NOTE — TELEPHONE ENCOUNTER
Spoke with patient  He is a patient of Dr Giovanni Taylor and seen at the [place] office  Patient has not been seen here in over a year, but has history of varicocele and hydrocele  Patient reports symptoms are starting again and now it also burns when he urinates  Offered to place orders in chart for patient to have urine testing done, but he declined stating he is seeing PCP tomorrow and will get testing done there  Encouraged patient to schedule appt here since PCP will refer him back here for problem  Patient is scheduled for 1/17/19 with MADELYN Jacobo  Advised patient to call office to see if there are any cancellations sooner than 1/17  Patient verbalized understanding

## 2019-01-04 ENCOUNTER — OFFICE VISIT (OUTPATIENT)
Dept: INTERNAL MEDICINE CLINIC | Facility: CLINIC | Age: 39
End: 2019-01-04
Payer: MEDICARE

## 2019-01-04 ENCOUNTER — HOSPITAL ENCOUNTER (OUTPATIENT)
Dept: ULTRASOUND IMAGING | Facility: HOSPITAL | Age: 39
Discharge: HOME | End: 2019-01-04
Payer: MEDICARE

## 2019-01-04 VITALS
WEIGHT: 156 LBS | OXYGEN SATURATION: 98 % | DIASTOLIC BLOOD PRESSURE: 78 MMHG | HEART RATE: 101 BPM | HEIGHT: 74 IN | BODY MASS INDEX: 20.02 KG/M2 | SYSTOLIC BLOOD PRESSURE: 140 MMHG | TEMPERATURE: 98.1 F

## 2019-01-04 DIAGNOSIS — N50.812 PAIN IN LEFT TESTICLE: ICD-10-CM

## 2019-01-04 DIAGNOSIS — R94.6 THYROID FUNCTION TEST ABNORMAL: ICD-10-CM

## 2019-01-04 DIAGNOSIS — N43.3 HYDROCELE, BILATERAL: ICD-10-CM

## 2019-01-04 DIAGNOSIS — N43.3 HYDROCELE, BILATERAL: Primary | ICD-10-CM

## 2019-01-04 DIAGNOSIS — I86.1 LEFT VARICOCELE: ICD-10-CM

## 2019-01-04 LAB
SL AMB  POCT GLUCOSE, UA: NORMAL
SL AMB LEUKOCYTE ESTERASE,UA: NORMAL
SL AMB POCT BILIRUBIN,UA: NORMAL
SL AMB POCT BLOOD,UA: NORMAL
SL AMB POCT CLARITY,UA: CLEAR
SL AMB POCT COLOR,UA: YELLOW
SL AMB POCT KETONES,UA: NORMAL
SL AMB POCT NITRITE,UA: NORMAL
SL AMB POCT PH,UA: 7
SL AMB POCT SPECIFIC GRAVITY,UA: 1.02
SL AMB POCT URINE PROTEIN: NORMAL
SL AMB POCT UROBILINOGEN: 0.2

## 2019-01-04 PROCEDURE — 87086 URINE CULTURE/COLONY COUNT: CPT | Performed by: PHYSICIAN ASSISTANT

## 2019-01-04 PROCEDURE — 81003 URINALYSIS AUTO W/O SCOPE: CPT | Performed by: PHYSICIAN ASSISTANT

## 2019-01-04 PROCEDURE — 99213 OFFICE O/P EST LOW 20 MIN: CPT | Performed by: PHYSICIAN ASSISTANT

## 2019-01-04 PROCEDURE — 76870 US EXAM SCROTUM: CPT

## 2019-01-04 RX ORDER — CIPROFLOXACIN 500 MG/1
500 TABLET, FILM COATED ORAL EVERY 12 HOURS SCHEDULED
Qty: 20 TABLET | Refills: 0 | Status: SHIPPED | OUTPATIENT
Start: 2019-01-04 | End: 2019-01-14

## 2019-01-04 NOTE — ASSESSMENT & PLAN NOTE
Reviewed normal TSH with abnormal T3T4  Will repeat TSH with T3T4 in 2 months  If this persists will further workup abnormal TSH

## 2019-01-04 NOTE — PROGRESS NOTES
[facility] [address] [place] [address]  Standard Office Visit  Patient ID: Stephani Santos III    : 1980  Age/Gender: 41 y o  male     DATE: 2019      Assessment/Plan:    Pain in left testicle  Left sided testicular pain  Has hx of intermittent symptoms for some time due to left sided varicocele as well as hydrocele  Previously tx for epididymitis/orchitis 2016 with cipro  Since then has been following with urology for intermittent flareups  Urine dip today in office  Continue with supportive care  Ice and elevation can provide support  Increase fluid intake  Void often  Start cipro 500 mg by mouth twice daily  DO not take daily multivitamins while on antibiotic  Avoid alcohol  Go for U/S now    Thyroid function test abnormal  Reviewed normal TSH with abnormal T3T4  Will repeat TSH with T3T4 in 2 months  If this persists will further workup abnormal TSH  Left varicocele  Sent for ultrasound and has follow-up scheduled with Urology  Diagnoses and all orders for this visit:    Hydrocele, bilateral  -     US scrotum and testicles; Future  -     ciprofloxacin (CIPRO) 500 mg tablet; Take 1 tablet (500 mg total) by mouth every 12 (twelve) hours for 10 days  -     Cancel: Urine culture; Future  -     POCT urine dip auto non-scope    Left varicocele  -     US scrotum and testicles; Future  -     ciprofloxacin (CIPRO) 500 mg tablet; Take 1 tablet (500 mg total) by mouth every 12 (twelve) hours for 10 days  -     Cancel: Urine culture; Future  -     Urine culture    Pain in left testicle  -     US scrotum and testicles; Future  -     ciprofloxacin (CIPRO) 500 mg tablet; Take 1 tablet (500 mg total) by mouth every 12 (twelve) hours for 10 days  -     Cancel: Urine culture; Future  -     Urine culture    Thyroid function test abnormal  -     TSH, 3rd generation with Free T4 reflex;  Future  -     T3; Future          Subjective:   Chief Complaint Patient presents with    Anxiety    Testicle Pain    Difficulty Urinating         Elvia Billings III is a 41 y o  male who presents to the office on 1/4/2019 for     For eval   Patient has left sided testicular discomfort which began when he was seated and was rocking  He notes that in years past he pinched a nerve in his left groin  7-8 days ago his symptoms began with a pinching sensation in his left groin and testicle  He has been taking OTC advil without improvement  At first was not having pain with urine however started to get pain with urination on new years day (3 days ago)  He called his urologist with whom he usually follows who scheduled him for an appointment  [facility] recommended under [facility] compression shorts which do help somewhat  Tried ice without improvement  Heat does help  He feels that when he urinates he does not fully empty his bladder  Not sexually active  New new partners  No hx of STI  Anxiety   Presents for follow-up visit  Symptoms include excessive worry and nervous/anxious behavior  Patient reports no chest pain, nausea or shortness of breath  Testicle Pain   The patient's primary symptoms include a genital injury (feels he was rocking which may have aggrivatd it  No definite trauma) and testicular pain (left sided)  The patient's pertinent negatives include no genital itching, genital lesions, pelvic pain, penile discharge, penile pain, priapism or scrotal swelling  This is a recurrent problem  The current episode started in the past 7 days  The problem occurs constantly  The problem has been unchanged  Associated symptoms include dysuria  Pertinent negatives include no abdominal pain, chest pain, chills, coughing, fever, flank pain, frequency, hematuria, hesitancy, nausea, painful intercourse, shortness of breath, urgency or vomiting  There is no reported injury  The problem affects the left side  Injury mechanism: see HPI   The testicular pain affects the left testicle  The symptoms are aggravated by urination  He has tried a cold pack, elevation and rest for the symptoms  The treatment provided mild relief  He is not sexually active  No, his partner does not have an STD  His past medical history is significant for varicocele  There is no history of BPH, chlamydia, erectile aid use, erectile dysfunction, a femoral hernia, gonorrhea, herpes simplex, HIV, kidney stones or prostatitis  Difficulty Urinating    This is a new problem  The current episode started 1 to 4 weeks ago  The problem occurs intermittently  The quality of the pain is described as burning  There has been no fever  He is not sexually active  There is no history of pyelonephritis  Pertinent negatives include no chills, flank pain, frequency, hematuria, hesitancy, nausea, urgency or vomiting  There is no history of kidney stones  The following portions of the patient's history were reviewed and updated as appropriate: allergies, current medications, past family history, past medical history, past social history, past surgical history and problem list     Review of Systems   Constitutional: Negative for chills, fever and unexpected weight change  Respiratory: Negative for cough and shortness of breath  Cardiovascular: Negative for chest pain  Gastrointestinal: Negative for abdominal pain, nausea and vomiting  Genitourinary: Positive for dysuria and testicular pain (left sided)  Negative for discharge, flank pain, frequency, genital sores, hematuria, hesitancy, pelvic pain, penile pain, scrotal swelling and urgency  Psychiatric/Behavioral: The patient is nervous/anxious            Patient Active Problem List   Diagnosis    Anxiety    Depression    Arthralgia of left knee    Chronic sinusitis    Lung nodule    Seasonal allergies    Thyroid function test abnormal    Skin cyst    Hydrocele, bilateral    Left varicocele    Pain in left testicle       Past Medical History:   Diagnosis Date    Allergic     Anxiety     Depression     Dry eye     resolved 12/16/2016    Epididymitis, left     Hydrocele, acquired     resolved 11/19/2015 last assessed 03/09/2016    Schizo-affective schizophrenia ([facility] [address] )     TSH elevation     resolved 09/14/2017       Past Surgical History:   Procedure Laterality Date    NO PAST SURGERIES      denied history of prior surgery    SKIN BIOPSY           Current Outpatient Prescriptions:     cycloSPORINE (RESTASIS) 0 05 % ophthalmic emulsion, Apply 1 drop to eye 2 (two) times a day, Disp: , Rfl:     ibuprofen (ADVIL) 200 mg tablet, Take 200 mg by mouth as needed for mild pain, Disp: , Rfl:     loratadine (CLARITIN) 10 mg tablet, Take 10 mg by mouth daily, Disp: , Rfl:     risperiDONE (RisperDAL) 4 mg tablet, Take 4 mg by mouth daily  , Disp: , Rfl:     valproic acid (DEPAKENE) 250 mg capsule, Take 250 mg by mouth 3 (three) times a day Indications: ER , Disp: , Rfl:     ciprofloxacin (CIPRO) 500 mg tablet, Take 1 tablet (500 mg total) by mouth every 12 (twelve) hours for 10 days, Disp: 20 tablet, Rfl: 0    Allergies   Allergen Reactions    Lithium Carbonate [Lithium] Other (See Comments)     acne       Social History     Social History    Marital status: Single     Spouse name: N/A    Number of children: N/A    Years of education: N/A     Social History Main Topics    Smoking status: Former Smoker     Packs/day: 1 00     Quit date: 2016    Smokeless tobacco: Never Used      Comment: quit 5/27/2016, recently stopped smoking last day of smoking 3/2/16  per [employer]ipts    Alcohol use No    Drug use: No      Comment: denied history of iv drugs, denied uses marijuana per allscripts    Sexual activity: Not Asked     Other Topics Concern    None     Social History Narrative    None       Family History   Problem Relation Age of Onset    Brain cancer Mother     Cancer Mother         glioblastoma    Diabetes type I Father     COPD Father  Diabetes type I Sister         with complications       PHQ-9 Depression Screening    PHQ-9:    Frequency of the following problems over the past two weeks:              Health Maintenance   Topic Date Due    Medicare Annual Wellness Visit (AWV)  1980    DTaP,Tdap,and Td Vaccines (1 - Tdap) 04/25/2002    INFLUENZA VACCINE  Completed       Immunization History   Administered Date(s) Administered    Influenza 10/21/2015    Influenza Quadrivalent Preservative Free 3 years and older IM 10/21/2015, 09/14/2017    Influenza, recombinant, quadrivalent,injectable, preservative free 12/04/2018        Objective:  Vitals:    01/04/19 1412 01/04/19 1501   BP: 140/78    BP Location: Left arm    Patient Position: Sitting    Cuff Size: Adult    Pulse: (!) 115 101   Temp: 98 1 °F (36 7 °C)    TempSrc: Oral    SpO2: 98%    Weight: 70 8 kg (156 lb)    Height: 6' 2" (1 88 m)      Wt Readings from Last 3 Encounters:   01/04/19 70 8 kg (156 lb)   08/24/18 66 3 kg (146 lb 3 2 oz)   08/13/18 68 kg (150 lb)     Body mass index is 20 03 kg/m²  No exam data present       Physical Exam   Constitutional: He is oriented to person, place, and time  He appears well-developed and well-nourished  No distress  Alert pleasant cooperative  Seated in room in no acute distress   HENT:   Head: Normocephalic and atraumatic  Mouth/Throat: Oropharynx is clear and moist  No oropharyngeal exudate  Moist mucous membranes  Normal posterior pharynx   Eyes: Pupils are equal, round, and reactive to light  Cardiovascular: Regular rhythm and normal heart sounds  Tachycardia present  No murmur heard  Pulmonary/Chest: Effort normal and breath sounds normal  No respiratory distress  He has no wheezes  He has no rales  Clear to auscultation  No crackles no rhonchi no wheeze   Abdominal: Soft  Bowel sounds are normal  He exhibits no distension  There is no tenderness  There is no guarding   Hernia confirmed negative in the right inguinal area and confirmed negative in the left inguinal area  No CVA tenderness to palpation  No suprapubic tenderness to palpation   Genitourinary:       Right testis shows no mass and no tenderness  Left testis shows tenderness  Left testis shows no mass  No penile erythema or penile tenderness  No discharge found  Musculoskeletal: He exhibits no edema  Neurological: He is alert and oriented to person, place, and time  Skin: Skin is warm and dry  No rash noted  He is not diaphoretic  Psychiatric: He has a normal mood and affect  His behavior is normal  Thought content normal    Nursing note and vitals reviewed            Future Appointments  Date Time Provider Chata Marie   1/4/2019 6:00 PM [facility]   1/17/2019 11:30 AM MADELYN Russell [facility]       Micheal Gaston PA-C  [address]    Patient Care Team:  Michael Gaston PA-C as PCP - General (Internal Medicine)  MD Yosi Corrales PA-C

## 2019-01-04 NOTE — ASSESSMENT & PLAN NOTE
Left sided testicular pain  Has hx of intermittent symptoms for some time due to left sided varicocele as well as hydrocele  Previously tx for epididymitis/orchitis 4/2016 with cipro  Since then has been following with urology for intermittent flareups  Urine dip today in office  Continue with supportive care  Ice and elevation can provide support  Increase fluid intake  Void often  Start cipro 500 mg by mouth twice daily  DO not take daily multivitamins while on antibiotic  Avoid alcohol    Go for U/S now

## 2019-01-04 NOTE — PATIENT INSTRUCTIONS
Pain in left testicle  Left sided testicular pain  Has hx of intermittent symptoms for some time due to left sided varicocele as well as hydrocele  Previously tx for epididymitis/orchitis 4/2016 with cipro  Since then has been following with urology for intermittent flareups  Urine dip today in office  Continue with supportive care  Ice and elevation can provide support  Increase fluid intake  Void often  Start cipro 500 mg by mouth twice daily  DO not take daily multivitamins while on antibiotic  Avoid alcohol  Go for U/S now    Thyroid function test abnormal  Reviewed normal TSH with abnormal T3T4  Will repeat TSH with T3T4 in 2 months  If this persists will further workup abnormal TSH

## 2019-01-05 LAB — BACTERIA UR CULT: NORMAL

## 2019-01-11 NOTE — PROGRESS NOTES
Spoke with patient  Feeling better  Reviewed labs and ultrasound  He has follow up scheduled in 7 days with his urologist   Continue with tx plan as discussed at last visit  Complete entire antibiotic course  Keep urology follow up

## 2019-09-04 ENCOUNTER — OFFICE VISIT (OUTPATIENT)
Dept: INTERNAL MEDICINE CLINIC | Facility: CLINIC | Age: 39
End: 2019-09-04
Payer: MEDICARE

## 2019-09-04 VITALS
HEART RATE: 78 BPM | TEMPERATURE: 97.5 F | SYSTOLIC BLOOD PRESSURE: 108 MMHG | BODY MASS INDEX: 20.15 KG/M2 | OXYGEN SATURATION: 99 % | WEIGHT: 157 LBS | DIASTOLIC BLOOD PRESSURE: 70 MMHG | HEIGHT: 74 IN

## 2019-09-04 DIAGNOSIS — J32.1 CHRONIC FRONTAL SINUSITIS: Primary | ICD-10-CM

## 2019-09-04 PROCEDURE — 99213 OFFICE O/P EST LOW 20 MIN: CPT | Performed by: INTERNAL MEDICINE

## 2019-09-04 RX ORDER — MONTELUKAST SODIUM 10 MG/1
10 TABLET ORAL
Qty: 30 TABLET | Refills: 2 | Status: SHIPPED | OUTPATIENT
Start: 2019-09-04

## 2019-09-04 NOTE — ASSESSMENT & PLAN NOTE
Continue loratadine 10 mg daily  Will start Singulair 10 mg at bedtime  Recommend use of humidifier and moisturizing nasal spray

## 2019-09-04 NOTE — PROGRESS NOTES
Assessment/Plan:    Chronic sinusitis  Continue loratadine 10 mg daily  Will start Singulair 10 mg at bedtime  Recommend use of humidifier and moisturizing nasal spray  Diagnoses and all orders for this visit:    Chronic frontal sinusitis  -     montelukast (SINGULAIR) 10 mg tablet; Take 1 tablet (10 mg total) by mouth daily at bedtime          BMI Counseling: Body mass index is 20 16 kg/m²  Discussed the patient's BMI with him  The BMI is above average  BMI counseling and education was provided to the patient  Nutrition recommendations include reducing portion sizes, decreasing overall calorie intake, 3-5 servings of fruits/vegetables daily, reducing fast food intake, consuming healthier snacks, decreasing soda and/or juice intake, moderation in carbohydrate intake, increasing intake of lean protein, reducing intake of saturated fat and trans fat and reducing intake of cholesterol  Exercise recommendations include moderate aerobic physical activity for 150 minutes/week and exercising 3-5 times per week  Subjective:      Patient ID: Luna Walton is a 46 y o  male  46year old former smoker is seen today for persistent chronic sinusitis and allergic rhinitis of 3 weeks duration  He has been compliant with loratadine 10 mg daily with minimal improvement of symptoms  He tried Flonase in the past and was stopped due to nasal mucosa ulcers  He has been evaluated by ENT in the past, CT of the sinuses showed "Well pneumatized paranasal sinuses"  He quit tobacco use 3 years ago  He has tried Neti pot over the weekend without improvement  He does not have humidifed air and has a window AC on during the day  Sinusitis   This is a chronic problem  The current episode started more than 1 year ago  The problem is unchanged  There has been no fever  He is experiencing no pain  Associated symptoms include headaches and sinus pressure   Pertinent negatives include no chills, congestion, coughing, diaphoresis, ear pain, hoarse voice, neck pain, shortness of breath, sneezing, sore throat or swollen glands  The following portions of the patient's history were reviewed and updated as appropriate: allergies, current medications, past family history, past medical history, past social history, past surgical history and problem list     Review of Systems   Constitutional: Negative  Negative for chills, diaphoresis, fatigue and fever  HENT: Positive for sinus pressure  Negative for congestion, ear pain, hoarse voice, postnasal drip, rhinorrhea, sneezing and sore throat  Eyes: Negative  Respiratory: Negative for cough, chest tightness, shortness of breath and wheezing  Cardiovascular: Negative for chest pain and palpitations  Gastrointestinal: Negative for abdominal distention, abdominal pain, blood in stool, constipation, diarrhea and nausea  Endocrine: Negative  Genitourinary: Negative for difficulty urinating, dysuria and hematuria  Musculoskeletal: Negative  Negative for neck pain  Skin: Negative  Allergic/Immunologic: Negative for environmental allergies and food allergies  Neurological: Positive for headaches  Hematological: Negative for adenopathy  Psychiatric/Behavioral: Negative for agitation, behavioral problems, confusion and sleep disturbance           Past Medical History:   Diagnosis Date    Allergic     Anxiety     Depression     Dry eye     resolved 12/16/2016    Epididymitis, left     Hydrocele, acquired     resolved 11/19/2015 last assessed 03/09/2016    Schizo-affective schizophrenia ([facility] [address] )     TSH elevation     resolved 09/14/2017         Current Outpatient Medications:     cycloSPORINE (RESTASIS) 0 05 % ophthalmic emulsion, Apply 1 drop to eye 2 (two) times a day, Disp: , Rfl:     ibuprofen (ADVIL) 200 mg tablet, Take 200 mg by mouth as needed for mild pain, Disp: , Rfl:     loratadine (CLARITIN) 10 mg tablet, Take 10 mg by mouth daily, Disp: , Rfl:   risperiDONE (RisperDAL) 4 mg tablet, Take 4 mg by mouth daily  , Disp: , Rfl:     valproic acid (DEPAKENE) 250 mg capsule, Take 250 mg by mouth 3 (three) times a day Indications: ER , Disp: , Rfl:     montelukast (SINGULAIR) 10 mg tablet, Take 1 tablet (10 mg total) by mouth daily at bedtime, Disp: 30 tablet, Rfl: 2    Allergies   Allergen Reactions    Lithium Carbonate [Lithium] Other (See Comments)     acne       Social History   Past Surgical History:   Procedure Laterality Date    NO PAST SURGERIES      denied history of prior surgery    SKIN BIOPSY       Family History   Problem Relation Age of Onset    Brain cancer Mother     Cancer Mother         glioblastoma    Diabetes type I Father     COPD Father     Diabetes type I Sister         with complications       Objective:  /70 (BP Location: Left arm, Patient Position: Sitting, Cuff Size: Adult)   Pulse 78   Temp 97 5 °F (36 4 °C) (Oral)   Ht 6' 2" (1 88 m)   Wt 71 2 kg (157 lb)   SpO2 99%   BMI 20 16 kg/m²     No results found for this or any previous visit (from the past 1344 hour(s))  Physical Exam   Constitutional: He is oriented to person, place, and time  He appears well-developed and well-nourished  No distress  HENT:   Head: Normocephalic and atraumatic  Eyes: Pupils are equal, round, and reactive to light  Conjunctivae and EOM are normal  Right eye exhibits no discharge  Left eye exhibits no discharge  No scleral icterus  Neck: Normal range of motion  Neck supple  No JVD present  No thyromegaly present  Cardiovascular: Normal rate, regular rhythm, normal heart sounds and intact distal pulses  Exam reveals no gallop and no friction rub  No murmur heard  Pulmonary/Chest: Effort normal and breath sounds normal  No respiratory distress  He has no wheezes  He has no rales  He exhibits no tenderness  Abdominal: Soft  Bowel sounds are normal  He exhibits no distension and no mass  There is no tenderness   There is no rebound and no guarding  Musculoskeletal: Normal range of motion  He exhibits no edema, tenderness or deformity  Lymphadenopathy:     He has no cervical adenopathy  Neurological: He is alert and oriented to person, place, and time  He has normal reflexes  No cranial nerve deficit  Coordination normal    Skin: Skin is warm and dry  No rash noted  He is not diaphoretic  No erythema  No pallor  Psychiatric: He has a normal mood and affect  His behavior is normal  Judgment and thought content normal    Nursing note and vitals reviewed

## 2019-12-27 ENCOUNTER — TELEPHONE (OUTPATIENT)
Dept: INTERNAL MEDICINE CLINIC | Facility: CLINIC | Age: 39
End: 2019-12-27

## 2020-01-16 ENCOUNTER — TELEPHONE (OUTPATIENT)
Dept: INTERNAL MEDICINE CLINIC | Facility: CLINIC | Age: 40
End: 2020-01-16

## 2020-06-03 ENCOUNTER — TELEPHONE (OUTPATIENT)
Dept: INTERNAL MEDICINE CLINIC | Facility: CLINIC | Age: 40
End: 2020-06-03

## 2021-01-25 ENCOUNTER — TRANSCRIBE ORDERS (OUTPATIENT)
Dept: ADMINISTRATIVE | Facility: HOSPITAL | Age: 41
End: 2021-01-25

## 2021-01-25 DIAGNOSIS — R14.3 EXCESSIVE GAS: ICD-10-CM

## 2021-01-25 DIAGNOSIS — R14.0 BLOATING: Primary | ICD-10-CM

## 2021-01-27 ENCOUNTER — HOSPITAL ENCOUNTER (OUTPATIENT)
Dept: ULTRASOUND IMAGING | Facility: HOSPITAL | Age: 41
Discharge: HOME | End: 2021-01-27
Payer: COMMERCIAL

## 2021-01-27 DIAGNOSIS — R14.0 BLOATING: ICD-10-CM

## 2021-01-27 DIAGNOSIS — R14.3 EXCESSIVE GAS: ICD-10-CM

## 2021-01-27 PROCEDURE — 76700 US EXAM ABDOM COMPLETE: CPT

## 2021-04-11 NOTE — PATIENT INSTRUCTIONS
This appears to be self-limiting in nature  There is no deficit in finger strength, there is normal capillary refill, no lacerations or physical changes to finger visualized  The finger has full ROM  Monitor it over time and if symptoms worsen, follow-up  Triage Note: diarrhea since yesterday, 6 episodes in the last hour, some redness noted to it, pt still eating and drinking well, tears noted in triage, also with one episode of vomiting Friday night

## 2022-11-25 ENCOUNTER — OFFICE VISIT (OUTPATIENT)
Dept: URGENT CARE | Age: 42
End: 2022-11-25

## 2022-11-25 VITALS
SYSTOLIC BLOOD PRESSURE: 146 MMHG | DIASTOLIC BLOOD PRESSURE: 79 MMHG | RESPIRATION RATE: 18 BRPM | HEART RATE: 82 BPM | OXYGEN SATURATION: 99 % | TEMPERATURE: 98.1 F

## 2022-11-25 DIAGNOSIS — R30.0 BURNING WITH URINATION: Primary | ICD-10-CM

## 2022-11-25 LAB
SL AMB  POCT GLUCOSE, UA: NORMAL
SL AMB LEUKOCYTE ESTERASE,UA: NORMAL
SL AMB POCT BILIRUBIN,UA: NORMAL
SL AMB POCT BLOOD,UA: NORMAL
SL AMB POCT CLARITY,UA: CLEAR
SL AMB POCT COLOR,UA: NORMAL
SL AMB POCT KETONES,UA: 15
SL AMB POCT NITRITE,UA: NORMAL
SL AMB POCT PH,UA: 6.5
SL AMB POCT SPECIFIC GRAVITY,UA: 1.01
SL AMB POCT URINE PROTEIN: NORMAL
SL AMB POCT UROBILINOGEN: 0.2

## 2022-11-25 RX ORDER — NEOMYCIN AND POLYMYXIN B SULFATES, BACITRACIN ZINC, AND HYDROCORTISONE 3.5; 5000; 400; 1 MG/G; [IU]/G; [IU]/G; MG/G
OINTMENT TOPICAL 2 TIMES DAILY
Qty: 15 G | Refills: 0 | Status: SHIPPED | OUTPATIENT
Start: 2022-11-25

## 2022-11-25 NOTE — PROGRESS NOTES
[address] [facility]        NAME: Jayson Jameson is a 40 y o  male  : 1980    MRN: [id number]  DATE: 2022  TIME: 4:58 PM    Assessment and Plan   Burning with urination [R30 0]  1  Burning with urination  POCT urine dip    bacitracin-neomycin-polymyxin b-hydrocortisone (Cortisporin) 1 % ointment        Patient states " about a month ago I had poor hygiene and started having penile pain, I noticed its worse when I eat hot sauce, it burns"  Denies drainage  Denies redness, swelling  Also states recovering from flu and his glands still feel swollen  No other symptoms  Denies suprapubic pain, frequency, discharge  Pain is when urine is exiting penis  No bleeding  Discussed possibilty of fissure considering recent poor hygiene and cleaning  Limit triggering foods and f/u with PCP  POCT urine negative  Patient Instructions       Follow up with PCP    Chief Complaint     Chief Complaint   Patient presents with   • Possible UTI     Pt c/o burning with urination, Sx for about 4 weeks  Pt also c/o feeling "crappy" overall and had flu like symptoms 2 weeks ago and "glands still feel swollen "         History of Present Illness       Patient states " about a month ago I had poor hygiene and started having penile pain, I noticed its worse when I eat hot sauce, it burns"  Denies drainage  Denies redness, swelling  Also states recovering from flu and his glands still feel swollen  No other symptoms  Denies suprapubic pain, frequency, discharge  Pain is when urine is exiting penis  No bleeding  Discussed possibilty of fissure considering recent poor hygiene and cleaning  Limit triggering foods and f/u with PCP  POCT urine negative  Review of Systems   Review of Systems   Constitutional: Negative for chills and fever  HENT: Negative for congestion, ear pain, postnasal drip, sinus pain and sore throat  Eyes: Negative for pain and itching     Respiratory: Negative for cough, shortness of breath and wheezing  Cardiovascular: Negative for chest pain and palpitations  Gastrointestinal: Negative for abdominal pain, constipation, diarrhea, nausea and vomiting  Genitourinary: Positive for penile pain  Negative for difficulty urinating, hematuria, penile discharge and urgency  Musculoskeletal: Negative for arthralgias and myalgias  Skin: Negative for rash  Neurological: Negative for dizziness, light-headedness and headaches  Psychiatric/Behavioral: Negative for agitation and sleep disturbance  The patient is not nervous/anxious  Current Medications       Current Outpatient Medications:   •  bacitracin-neomycin-polymyxin b-hydrocortisone (Cortisporin) 1 % ointment, Apply topically 2 (two) times a day, Disp: 15 g, Rfl: 0  •  cycloSPORINE (RESTASIS) 0 05 % ophthalmic emulsion, Apply 1 drop to eye 2 (two) times a day, Disp: , Rfl:   •  ibuprofen (MOTRIN) 200 mg tablet, Take 200 mg by mouth as needed for mild pain, Disp: , Rfl:   •  loratadine (CLARITIN) 10 mg tablet, Take 10 mg by mouth daily, Disp: , Rfl:   •  montelukast (SINGULAIR) 10 mg tablet, Take 1 tablet (10 mg total) by mouth daily at bedtime, Disp: 30 tablet, Rfl: 2  •  risperiDONE (RisperDAL) 4 mg tablet, Take 4 mg by mouth daily  , Disp: , Rfl:   •  valproic acid (DEPAKENE) 250 mg capsule, Take 250 mg by mouth 3 (three) times a day Indications: ER , Disp: , Rfl:     Current Allergies     Allergies as of 11/25/2022 - Reviewed 11/25/2022   Allergen Reaction Noted   • Lithium carbonate [lithium] Other (See Comments) 02/11/2016            The following portions of the patient's history were reviewed and updated as appropriate: allergies, current medications, past family history, past medical history, past social history, past surgical history and problem list      Past Medical History:   Diagnosis Date   • Allergic    • Anxiety    • Depression    • Dry eye     resolved 12/16/2016   • Epididymitis, left    • Hydrocele, acquired     resolved 11/19/2015 last assessed 03/09/2016   • Schizo-affective schizophrenia ([facility] [address] )    • TSH elevation     resolved 09/14/2017       Past Surgical History:   Procedure Laterality Date   • NO PAST SURGERIES      denied history of prior surgery   • SKIN BIOPSY         Family History   Problem Relation Age of Onset   • Brain cancer Mother    • Cancer Mother         glioblastoma   • Diabetes type I Father    • COPD Father    • Diabetes type I Sister         with complications         Medications have been verified  Objective   /79   Pulse 82   Temp 98 1 °F (36 7 °C) (Tympanic)   Resp 18   SpO2 99%   No LMP for male patient  Physical Exam     Physical Exam  Constitutional:       Appearance: Normal appearance  HENT:      Nose: Nose normal    Cardiovascular:      Rate and Rhythm: Normal rate and regular rhythm  Pulses: Normal pulses  Heart sounds: Normal heart sounds  Musculoskeletal:         General: Normal range of motion  Lymphadenopathy:      Cervical: Cervical adenopathy present  Skin:     General: Skin is warm and dry  Neurological:      Mental Status: He is alert